# Patient Record
Sex: MALE | Race: BLACK OR AFRICAN AMERICAN | NOT HISPANIC OR LATINO | Employment: FULL TIME | URBAN - METROPOLITAN AREA
[De-identification: names, ages, dates, MRNs, and addresses within clinical notes are randomized per-mention and may not be internally consistent; named-entity substitution may affect disease eponyms.]

---

## 2019-03-13 ENCOUNTER — TELEPHONE (OUTPATIENT)
Dept: FAMILY MEDICINE | Facility: CLINIC | Age: 44
End: 2019-03-13

## 2019-03-13 NOTE — TELEPHONE ENCOUNTER
----- Message from Ashley Ponce sent at 3/13/2019  3:32 PM CDT -----  Contact: pt  Pt would like to be called back regarding getting a new patient appt    Pt can be reached at [phone]

## 2019-03-13 NOTE — TELEPHONE ENCOUNTER
Patient advised that Dr. Lee is not taking patient's at this time. Patient asked if Dr. Lee had any particular PCP that he recommends.

## 2019-03-15 NOTE — TELEPHONE ENCOUNTER
Patient advised per Dr. Lee that he did not have a particular provider he recommended, but did say that the providers at the [facility] are all very good.

## 2020-02-27 ENCOUNTER — TELEPHONE (OUTPATIENT)
Dept: BARIATRICS | Facility: CLINIC | Age: 45
End: 2020-02-27

## 2020-02-27 NOTE — TELEPHONE ENCOUNTER
Returned patient call. Was able to assist with scheduling a consult appointment.     Appointment reminder. Has been sent in the mail along with directions below.     Pt. verbally expressed understanding.

## 2020-04-29 ENCOUNTER — OFFICE VISIT (OUTPATIENT)
Dept: ALLERGY | Facility: CLINIC | Age: 45
End: 2020-04-29
Payer: COMMERCIAL

## 2020-04-29 VITALS — BODY MASS INDEX: 32.2 KG/M2 | HEIGHT: 73 IN | WEIGHT: 242.94 LBS

## 2020-04-29 DIAGNOSIS — J31.0 CHRONIC RHINITIS: Primary | ICD-10-CM

## 2020-04-29 DIAGNOSIS — K21.9 GASTROESOPHAGEAL REFLUX DISEASE, ESOPHAGITIS PRESENCE NOT SPECIFIED: ICD-10-CM

## 2020-04-29 PROCEDURE — 3008F PR BODY MASS INDEX (BMI) DOCUMENTED: ICD-10-PCS | Mod: CPTII,S$GLB,, | Performed by: ALLERGY & IMMUNOLOGY

## 2020-04-29 PROCEDURE — 99204 OFFICE O/P NEW MOD 45 MIN: CPT | Mod: S$GLB,,, | Performed by: ALLERGY & IMMUNOLOGY

## 2020-04-29 PROCEDURE — 99999 PR PBB SHADOW E&M-EST. PATIENT-LVL II: ICD-10-PCS | Mod: PBBFAC,,, | Performed by: ALLERGY & IMMUNOLOGY

## 2020-04-29 PROCEDURE — 99999 PR PBB SHADOW E&M-EST. PATIENT-LVL II: CPT | Mod: PBBFAC,,, | Performed by: ALLERGY & IMMUNOLOGY

## 2020-04-29 PROCEDURE — 3008F BODY MASS INDEX DOCD: CPT | Mod: CPTII,S$GLB,, | Performed by: ALLERGY & IMMUNOLOGY

## 2020-04-29 PROCEDURE — 99204 PR OFFICE/OUTPT VISIT, NEW, LEVL IV, 45-59 MIN: ICD-10-PCS | Mod: S$GLB,,, | Performed by: ALLERGY & IMMUNOLOGY

## 2020-04-29 RX ORDER — FLUTICASONE PROPIONATE 50 MCG
SPRAY, SUSPENSION (ML) NASAL
COMMUNITY
Start: 2020-03-26 | End: 2022-11-03

## 2020-04-29 RX ORDER — MINERAL OIL
180 ENEMA (ML) RECTAL DAILY
COMMUNITY
End: 2020-07-15 | Stop reason: CLARIF

## 2020-04-29 RX ORDER — HYDROGEN PEROXIDE 3 %
20 SOLUTION, NON-ORAL MISCELLANEOUS
Qty: 30 CAPSULE | Refills: 3 | Status: SHIPPED | OUTPATIENT
Start: 2020-04-29 | End: 2021-06-28

## 2020-04-29 RX ORDER — FLUTICASONE PROPIONATE 50 MCG
2 SPRAY, SUSPENSION (ML) NASAL DAILY
Qty: 16 G | Refills: 5 | Status: SHIPPED | OUTPATIENT
Start: 2020-04-29 | End: 2020-05-29

## 2020-04-29 NOTE — PROGRESS NOTES
Cortez Napoles is a 45-year-old male who presents to clinic today for evaluation of chronic rhinitis.  He is here alone.  His primary care physician is Dr. Elliot Uriostegui.    This past February he started having bilateral nasal congestion that alternates.  He initially saw a physician the prescribed antibiotics and Flonase.  His primary care physician then added Allegra.  He did try Sudafed for a while as well but did think it worked as well.    He does think on Flonase and Allegra his symptoms have improved.  He has reduced the amount Flonase that he takes two every other day.    He denies any conjunctivitis.  He denies any other rhinitis.  He denies any cough, wheezing, or shortness of breath.  He denies any history of asthma.    For ROS, FH, SH please see Allergy and Asthma Questionnaire dated today.    Some relevant pertinent positives:    Review of Systems/PMH:  He does have gastroesophageal reflux disease that is controlled on Nexium 20 milligrams a day.    Family History:  His brother has rhinitis.    Home environment:  He has lived in the same house for the past five years.  There was no water damage.  There is no evidence of mold.  There is carpeting in the bedroom.  There are no pets.    Social History:  He is a nonsmoker.  He works as an  with the [employer].    Physical Examination:  General: Well-developed, well-nourished, no acute distress.  Head: No sinus tenderness.  Eyes: Conjunctivae:  No bulbar or palpebral conjunctival injection.  Ears: EAC's clear.  TM's clear.  No pre-auricular nodes.  Nose: Nasal Mucosa:  Pink.  Septum: No apparent deviation.  Turbinates:  No significant edema.  Polyps/Mass:  None visible.  Teeth/Gums:  No bleeding noted.  Oropharynx: No exudates.  Neck: Supple without thyromegaly. No cervical lymphadenopathy.    Respiratory/Chest: Effort: Good.  Auscultation:  Clear bilaterally.  Cardiovascular:  No murmur, rubs, or gallop heard.   GI:   Non-tender.  No masses.  No organomegaly.  Extremities:  No cyanosis, clubbing, or edema.  Skin: Good turgor.  No urticaria or angioedema.  Neuro/Psych: Oriented x 3.    Assessment:  1.  Chronic rhinitis, consider allergic.  2.  GERD, controlled.    Recommendations:  1.  Laboratory as ordered.  2.  Increase fluticasone to two sprays each nostril daily.  3.  Continue Allegra for now.  4.  Return to clinic in one week.  5.  Consider skin testing off antihistamines if needed.

## 2020-05-07 ENCOUNTER — OFFICE VISIT (OUTPATIENT)
Dept: OTOLARYNGOLOGY | Facility: CLINIC | Age: 45
End: 2020-05-07
Payer: COMMERCIAL

## 2020-05-07 ENCOUNTER — OFFICE VISIT (OUTPATIENT)
Dept: ALLERGY | Facility: CLINIC | Age: 45
End: 2020-05-07
Payer: COMMERCIAL

## 2020-05-07 VITALS
DIASTOLIC BLOOD PRESSURE: 97 MMHG | TEMPERATURE: 98 F | SYSTOLIC BLOOD PRESSURE: 127 MMHG | BODY MASS INDEX: 31.76 KG/M2 | HEART RATE: 56 BPM | WEIGHT: 240.75 LBS

## 2020-05-07 VITALS — HEIGHT: 73 IN | BODY MASS INDEX: 31.91 KG/M2 | WEIGHT: 240.75 LBS

## 2020-05-07 DIAGNOSIS — J31.0 CHRONIC RHINITIS: Primary | ICD-10-CM

## 2020-05-07 DIAGNOSIS — R04.0 EPISTAXIS: ICD-10-CM

## 2020-05-07 DIAGNOSIS — J31.0 RHINITIS, UNSPECIFIED TYPE: Primary | ICD-10-CM

## 2020-05-07 DIAGNOSIS — K21.9 GASTROESOPHAGEAL REFLUX DISEASE, ESOPHAGITIS PRESENCE NOT SPECIFIED: ICD-10-CM

## 2020-05-07 PROCEDURE — 99999 PR PBB SHADOW E&M-EST. PATIENT-LVL III: ICD-10-PCS | Mod: PBBFAC,,, | Performed by: OTOLARYNGOLOGY

## 2020-05-07 PROCEDURE — 3008F PR BODY MASS INDEX (BMI) DOCUMENTED: ICD-10-PCS | Mod: CPTII,S$GLB,, | Performed by: ALLERGY & IMMUNOLOGY

## 2020-05-07 PROCEDURE — 3008F BODY MASS INDEX DOCD: CPT | Mod: CPTII,S$GLB,, | Performed by: ALLERGY & IMMUNOLOGY

## 2020-05-07 PROCEDURE — 99214 PR OFFICE/OUTPT VISIT, EST, LEVL IV, 30-39 MIN: ICD-10-PCS | Mod: 25,S$GLB,, | Performed by: ALLERGY & IMMUNOLOGY

## 2020-05-07 PROCEDURE — 99999 PR PBB SHADOW E&M-EST. PATIENT-LVL II: CPT | Mod: PBBFAC,,, | Performed by: ALLERGY & IMMUNOLOGY

## 2020-05-07 PROCEDURE — 99999 PR PBB SHADOW E&M-EST. PATIENT-LVL II: ICD-10-PCS | Mod: PBBFAC,,, | Performed by: ALLERGY & IMMUNOLOGY

## 2020-05-07 PROCEDURE — 3008F PR BODY MASS INDEX (BMI) DOCUMENTED: ICD-10-PCS | Mod: CPTII,S$GLB,, | Performed by: OTOLARYNGOLOGY

## 2020-05-07 PROCEDURE — 3008F BODY MASS INDEX DOCD: CPT | Mod: CPTII,S$GLB,, | Performed by: OTOLARYNGOLOGY

## 2020-05-07 PROCEDURE — 31231 NASAL ENDOSCOPY DX: CPT | Mod: S$GLB,,, | Performed by: OTOLARYNGOLOGY

## 2020-05-07 PROCEDURE — 95004 PR ALLERGY SKIN TESTS,ALLERGENS: ICD-10-PCS | Mod: S$GLB,,, | Performed by: ALLERGY & IMMUNOLOGY

## 2020-05-07 PROCEDURE — 99214 OFFICE O/P EST MOD 30 MIN: CPT | Mod: 25,S$GLB,, | Performed by: ALLERGY & IMMUNOLOGY

## 2020-05-07 PROCEDURE — 95004 PERQ TESTS W/ALRGNC XTRCS: CPT | Mod: S$GLB,,, | Performed by: ALLERGY & IMMUNOLOGY

## 2020-05-07 PROCEDURE — 99203 PR OFFICE/OUTPT VISIT, NEW, LEVL III, 30-44 MIN: ICD-10-PCS | Mod: 25,S$GLB,, | Performed by: OTOLARYNGOLOGY

## 2020-05-07 PROCEDURE — 31231 PR NASAL ENDOSCOPY, DX: ICD-10-PCS | Mod: S$GLB,,, | Performed by: OTOLARYNGOLOGY

## 2020-05-07 PROCEDURE — 99203 OFFICE O/P NEW LOW 30 MIN: CPT | Mod: 25,S$GLB,, | Performed by: OTOLARYNGOLOGY

## 2020-05-07 PROCEDURE — 99999 PR PBB SHADOW E&M-EST. PATIENT-LVL III: CPT | Mod: PBBFAC,,, | Performed by: OTOLARYNGOLOGY

## 2020-05-07 RX ORDER — METHYLPREDNISOLONE 4 MG/1
TABLET ORAL
Qty: 1 PACKAGE | Refills: 0 | Status: SHIPPED | OUTPATIENT
Start: 2020-05-07 | End: 2020-05-28

## 2020-05-07 NOTE — PROGRESS NOTES
Cortez Napoles returns to clinic today for continued evaluation of chronic rhinitis.  He is here alone.  He was last seen April 29, 2020.    Since his last visit, he has been taking Flonase daily.  He was able to stop his Allegra last Sunday.    He continues to have bilateral nasal congestion that alternates.  He thinks that is worse on the left.    Over the past week he has had to nose bleeds on the left.  They have lasted several minutes before resolving after applying pressure.  He also had another one after blowing his nose that resolved quickly.    He denies any conjunctivitis.  He denies any cough, wheezing, or shortness of breath.  He denies any history of asthma.    [facility] PEQ ALLERGY QUESTIONNAIRE SHORT 5/7/2020   Head or facial pain: No symptoms   Ear discharge? No   Ear pain? No   Hearing loss? No   Nosebleeds? Yes   Postnasal drip? No   Sneezing? Yes   Runny nose? No   Congestion? Yes   Throat: No symptoms   Eyes: No symptoms   Lungs: No symptoms   Skin: No symptoms     Physical Examination:  General: Well-developed, well-nourished, no acute distress.  Head: No sinus tenderness.  Eyes: Conjunctivae:  No bulbar or palpebral conjunctival injection.  Ears: EAC's clear.  TM's clear.  No pre-auricular nodes.  Nose: Nasal Mucosa:  Pink.  Septum: No apparent deviation.  Turbinates:  No significant edema.  Polyps/Mass:  None visible.  Teeth/Gums:  No bleeding noted.  Oropharynx: No exudates.  Neck: Supple without thyromegaly. No cervical lymphadenopathy.    Respiratory/Chest: Effort: Good.  Auscultation:  Clear bilaterally.  Skin: Good turgor.  No urticaria or angioedema.  Neuro/Psych: Oriented x 3.    Laboratory 04/29/2020:  IgE level:  Less than 35.  ImmunoCAP:  Negative.    Inhalant skin test 5/7/2020:  3+ histamine control.  All tests were negative.    Assessment:  1.  Chronic rhinitis, nonallergic.  2.  Epistaxis, possibly secondary to Flonase use.  3.  GERD, controlled.    Recommendations:  1.  Discontinue  Flonase for now.  2.  ENT evaluation for nasal congestion and epistaxis.  3.  Return to clinic as needed.

## 2020-05-07 NOTE — LETTER
May 7, 2020      MARKY Gaviria III, MD  [address] [facility]  [facility] [address]           [facility]  [address]  [place] [address]  Phone: [phone]  Fax: [phone]          Patient: Cortez Napoles   MR Number: [id number]   YOB: 1975   Date of Visit: 5/7/2020       Dear Dr. MARKY Gaviria III:    Thank you for referring Cortez Napoles to me for evaluation. Attached you will find relevant portions of my assessment and plan of care.    If you have questions, please do not hesitate to call me. I look forward to following Cortez Napoles along with you.    Sincerely,    Steven Ferrell MD    Enclosure  CC:  No Recipients    If you would like to receive this communication electronically, please contact [email or web address] or [phone] to request more information on [email or web address] Link access.    For providers and/or their staff who would like to refer a patient to [facility], please contact us through our one-stop-shop provider referral line, [facility], at [phone].    If you feel you have received this communication in error or would no longer like to receive these types of communications, please e-mail [email or web address]

## 2020-05-08 PROBLEM — J31.0 RHINITIS: Status: ACTIVE | Noted: 2020-05-08

## 2020-05-08 NOTE — PROGRESS NOTES
Chief Complaint   Patient presents with    Consult     L nasal congestion, nasal bleeds       HPI   45 y.o. male presents for evaluation of nasal congestion, left greater than right and recent left-sided epistaxis.  He reports he has had 3 episodes of epistaxis in the last several weeks.  These all stop spontaneously.  He bleeds only from the left side of his nose.  He denies pain or headache.  He reports some degree of nasal congestion.  He has been utilizing Flonase for this problem.    Review of Systems   Constitutional: Negative for fatigue and unexpected weight change.   HENT: Per HPI.  Eyes: Negative for visual disturbance.   Respiratory: Negative for shortness of breath, hemoptysis   Cardiovascular: Negative for chest pain and palpitations.   Musculoskeletal: Negative for decreased ROM, back pain.   Skin: Negative for rash, sunburn, itching.   Neurological: Negative for dizziness and seizures.   Hematological: Negative for adenopathy. Does not bruise/bleed easily.   Endocrine: Negative for rapid weight loss/weight gain, heat/cold intolerance.     Past Medical History   There is no problem list on file for this patient.          Past Surgical History   History reviewed. No pertinent surgical history.      Family History   History reviewed. No pertinent family history.        Social History   .  Social History     Socioeconomic History    Marital status: Single     Spouse name: Not on file    Number of children: Not on file    Years of education: Not on file    Highest education level: Not on file   Occupational History    Not on file   Social Needs    Financial resource strain: Not on file    Food insecurity:     Worry: Not on file     Inability: Not on file    Transportation needs:     Medical: Not on file     Non-medical: Not on file   Tobacco Use    Smoking status: Never Smoker    Smokeless tobacco: Never Used   Substance and Sexual Activity    Alcohol use: Yes    Drug use: Not on file     Sexual activity: Not on file   Lifestyle    Physical activity:     Days per week: Not on file     Minutes per session: Not on file    Stress: Not on file   Relationships    Social connections:     Talks on phone: Not on file     Gets together: Not on file     Attends Voodoo service: Not on file     Active member of club or organization: Not on file     Attends meetings of clubs or organizations: Not on file     Relationship status: Not on file   Other Topics Concern    Not on file   Social History Narrative    Not on file         Allergies   Review of patient's allergies indicates:  No Known Allergies        Physical Exam     Vitals:    05/07/20 0919   BP: (!) 127/97   Pulse: (!) 56   Temp: 97.5 °F (36.4 °C)         Body mass index is 31.76 kg/m².      General: AOx3, NAD   Respiratory:  Symmetric chest rise, normal effort  Nose: No gross nasal septal deviation.  Small excoriation of left anterior nasal septum.  Inferior Turbinates WNL bilaterally. No septal perforation. No masses/lesions.   Oral Cavity:  Oral Tongue mobile, no lesions noted. Hard Palate WNL. No buccal or FOM lesions.  Oropharynx:  No masses/lesions of the posterior pharyngeal wall. Tonsillar fossa without lesions. Soft palate without masses. Midline uvula.   Neck: No scars.  No cervical lymphadenopathy, thyromegaly or thyroid nodules.  Normal range of motion.    Face: House Brackmann I bilaterally.     Nasal Nasoph Endocope Procedures #4    Procedure:  Diagnostic nasal and nasopharyngoscopy with endoscope:    Routine preparation with local atomizer with 1% Romain-Synephrine/Pontocaine with customary zero degree and/or 30 degree Storz endoscope:    NOSE:     External:  No gross deformity.   Intranasal:    Mucosa:  No polyps, ulcers or lesions.    Septum:  No gross deformity.    Turbinates:  Not enlarged.   Nasopharynx:  No lesions.    Mucosa:  No lesions.    Adenoids:  Present.    Posterior Choanae:  Patent.    Eustachian Tubes:   Patent.    Additional Findings:  No worrisome lesions.    Assessment/Plan  Problem List Items Addressed This Visit        ENT    Rhinitis - Primary     With associated epistaxis.  I suspect that his nosebleeds a secondary to the drying effect of Flonase.  I urged him to stop using this medication.  I see no worrisome lesions on exam.  I also urged him to moisturize his nose with nasal saline spray and to apply Vaseline or Aquaphor to the anterior aspect of the nose to further moisturize it.  I provided a Medrol Dosepak for relief of any inflammation that may be contributing to this complaint.  He is to contact me if he is not improved in 2 weeks.         Relevant Medications    methylPREDNISolone (MEDROL DOSEPACK) 4 mg tablet

## 2020-05-08 NOTE — ASSESSMENT & PLAN NOTE
With associated epistaxis.  I suspect that his nosebleeds a secondary to the drying effect of Flonase.  I urged him to stop using this medication.  I see no worrisome lesions on exam.  I also urged him to moisturize his nose with nasal saline spray and to apply Vaseline or Aquaphor to the anterior aspect of the nose to further moisturize it.  I provided a Medrol Dosepak for relief of any inflammation that may be contributing to this complaint.  He is to contact me if he is not improved in 2 weeks.

## 2020-05-18 ENCOUNTER — OFFICE VISIT (OUTPATIENT)
Dept: INTERNAL MEDICINE | Facility: CLINIC | Age: 45
End: 2020-05-18
Payer: COMMERCIAL

## 2020-05-18 ENCOUNTER — LAB VISIT (OUTPATIENT)
Dept: LAB | Facility: HOSPITAL | Age: 45
End: 2020-05-18
Attending: NURSE PRACTITIONER
Payer: COMMERCIAL

## 2020-05-18 VITALS
WEIGHT: 237.88 LBS | HEART RATE: 57 BPM | SYSTOLIC BLOOD PRESSURE: 102 MMHG | RESPIRATION RATE: 16 BRPM | HEIGHT: 73 IN | TEMPERATURE: 98 F | DIASTOLIC BLOOD PRESSURE: 80 MMHG | BODY MASS INDEX: 31.53 KG/M2

## 2020-05-18 DIAGNOSIS — Z76.89 ENCOUNTER TO ESTABLISH CARE: ICD-10-CM

## 2020-05-18 DIAGNOSIS — J32.0 CHRONIC MAXILLARY SINUSITIS: Primary | ICD-10-CM

## 2020-05-18 DIAGNOSIS — J32.0 CHRONIC MAXILLARY SINUSITIS: ICD-10-CM

## 2020-05-18 LAB
ALBUMIN SERPL BCP-MCNC: 4 G/DL (ref 3.5–5.2)
ALP SERPL-CCNC: 59 U/L (ref 55–135)
ALT SERPL W/O P-5'-P-CCNC: 54 U/L (ref 10–44)
ANION GAP SERPL CALC-SCNC: 6 MMOL/L (ref 8–16)
AST SERPL-CCNC: 32 U/L (ref 10–40)
BASOPHILS # BLD AUTO: 0.03 K/UL (ref 0–0.2)
BASOPHILS NFR BLD: 0.5 % (ref 0–1.9)
BILIRUB SERPL-MCNC: 0.2 MG/DL (ref 0.1–1)
BUN SERPL-MCNC: 11 MG/DL (ref 6–20)
CALCIUM SERPL-MCNC: 9.1 MG/DL (ref 8.7–10.5)
CHLORIDE SERPL-SCNC: 104 MMOL/L (ref 95–110)
CO2 SERPL-SCNC: 30 MMOL/L (ref 23–29)
CREAT SERPL-MCNC: 1.3 MG/DL (ref 0.5–1.4)
DIFFERENTIAL METHOD: ABNORMAL
EOSINOPHIL # BLD AUTO: 0.1 K/UL (ref 0–0.5)
EOSINOPHIL NFR BLD: 1.3 % (ref 0–8)
ERYTHROCYTE [DISTWIDTH] IN BLOOD BY AUTOMATED COUNT: 14.7 % (ref 11.5–14.5)
EST. GFR  (AFRICAN AMERICAN): >60 ML/MIN/1.73 M^2
EST. GFR  (NON AFRICAN AMERICAN): >60 ML/MIN/1.73 M^2
GLUCOSE SERPL-MCNC: 82 MG/DL (ref 70–110)
HCT VFR BLD AUTO: 46.1 % (ref 40–54)
HGB BLD-MCNC: 14.9 G/DL (ref 14–18)
IMM GRANULOCYTES # BLD AUTO: 0.02 K/UL (ref 0–0.04)
IMM GRANULOCYTES NFR BLD AUTO: 0.3 % (ref 0–0.5)
LYMPHOCYTES # BLD AUTO: 2.6 K/UL (ref 1–4.8)
LYMPHOCYTES NFR BLD: 41.1 % (ref 18–48)
MCH RBC QN AUTO: 30.8 PG (ref 27–31)
MCHC RBC AUTO-ENTMCNC: 32.3 G/DL (ref 32–36)
MCV RBC AUTO: 95 FL (ref 82–98)
MONOCYTES # BLD AUTO: 0.6 K/UL (ref 0.3–1)
MONOCYTES NFR BLD: 8.8 % (ref 4–15)
NEUTROPHILS # BLD AUTO: 3.1 K/UL (ref 1.8–7.7)
NEUTROPHILS NFR BLD: 48 % (ref 38–73)
NRBC BLD-RTO: 0 /100 WBC
PLATELET # BLD AUTO: 186 K/UL (ref 150–350)
PMV BLD AUTO: 11.3 FL (ref 9.2–12.9)
POTASSIUM SERPL-SCNC: 4.3 MMOL/L (ref 3.5–5.1)
PROT SERPL-MCNC: 7.1 G/DL (ref 6–8.4)
RBC # BLD AUTO: 4.84 M/UL (ref 4.6–6.2)
SODIUM SERPL-SCNC: 140 MMOL/L (ref 136–145)
WBC # BLD AUTO: 6.37 K/UL (ref 3.9–12.7)

## 2020-05-18 PROCEDURE — 99999 PR PBB SHADOW E&M-EST. PATIENT-LVL III: CPT | Mod: PBBFAC,,, | Performed by: NURSE PRACTITIONER

## 2020-05-18 PROCEDURE — 80053 COMPREHEN METABOLIC PANEL: CPT

## 2020-05-18 PROCEDURE — 36415 COLL VENOUS BLD VENIPUNCTURE: CPT | Mod: PO

## 2020-05-18 PROCEDURE — 99203 PR OFFICE/OUTPT VISIT, NEW, LEVL III, 30-44 MIN: ICD-10-PCS | Mod: S$GLB,,, | Performed by: NURSE PRACTITIONER

## 2020-05-18 PROCEDURE — 99999 PR PBB SHADOW E&M-EST. PATIENT-LVL III: ICD-10-PCS | Mod: PBBFAC,,, | Performed by: NURSE PRACTITIONER

## 2020-05-18 PROCEDURE — 99203 OFFICE O/P NEW LOW 30 MIN: CPT | Mod: S$GLB,,, | Performed by: NURSE PRACTITIONER

## 2020-05-18 PROCEDURE — 85025 COMPLETE CBC W/AUTO DIFF WBC: CPT

## 2020-05-18 NOTE — PROGRESS NOTES
[facility] Note    Chief Complaint      Chief Complaint   Patient presents with    Annual Exam     History of Present Illness      Cortez Napoles is a 45 y.o. male patient with chronic conditions of GERD, seasonal allergies who is new to me and presents today for establish care visit.  Patient complains chronic sinusitis over the last 3 months, denies shortness of breath or chest pain, reviewed meds and history with patient.  Patient reports was seen by ENT, and was allergy tested.  Patient states tried to take allergy medicine but dried his nasal mucus up too much, but does have a lot of nasal congestion and head pressure.  Denies fever chills shortness of breath unable to swallow loss of hearing.  Is taking Mucinex daily.  Patient wants to know if he has an infection and 1 some sort of testing done    Patient drinks plenty water, eat to clean diet, runs jump throat and plays basketball for exercise.    Problem List Items Addressed This Visit     None      Visit Diagnoses     Chronic maxillary sinusitis    -  Primary    Relevant Orders    CBC auto differential (Completed)    Comprehensive metabolic panel (Completed)    Encounter to establish care              Health Maintenance   Topic Date Due    Lipid Panel  1975    TETANUS VACCINE  04/14/1991       History reviewed. No pertinent past medical history.    History reviewed. No pertinent surgical history.    family history is not on file.     Social History     Tobacco Use    Smoking status: Never Smoker    Smokeless tobacco: Never Used   Substance Use Topics    Alcohol use: Yes    Drug use: Not on file       Review of Systems   Constitutional: Negative for chills and fever.   HENT: Positive for congestion and sinus pain. Negative for sore throat.    Eyes: Negative for blurred vision.   Respiratory: Negative for cough, shortness of breath and wheezing.    Cardiovascular: Negative for chest pain, palpitations and leg swelling.  "  Gastrointestinal: Negative for abdominal pain, constipation, diarrhea, nausea and vomiting.   Genitourinary: Negative for dysuria.   Musculoskeletal: Negative for myalgias.   Neurological: Negative for dizziness, weakness and headaches.   Psychiatric/Behavioral: Negative for depression. The patient is not nervous/anxious.         Outpatient Encounter Medications as of 5/18/2020   Medication Sig Dispense Refill    ELDERBERRY FRUIT AND FLOWER ORAL Take by mouth.      esomeprazole (NEXIUM) 20 MG capsule Take 1 capsule (20 mg total) by mouth before breakfast. 30 capsule 3    fexofenadine (ALLEGRA) 180 MG tablet Take 180 mg by mouth once daily.      fluticasone propionate (FLONASE) 50 mcg/actuation nasal spray       fluticasone propionate (FLONASE) 50 mcg/actuation nasal spray 2 sprays (100 mcg total) by Each Nostril route once daily. 16 g 5    methylPREDNISolone (MEDROL DOSEPACK) 4 mg tablet use as directed 1 Package 0     No facility-administered encounter medications on file as of 5/18/2020.        Review of patient's allergies indicates:  No Known Allergies    Physical Exam      Vital Signs  Temp: 98.3 °F (36.8 °C)  Temp src: Oral  Pulse: (!) 57  Resp: 16  BP: 102/80  Pain Score: 0-No pain  Height and Weight  Height: 6' 1" (185.4 cm)  Weight: 107.9 kg (237 lb 14 oz)  BSA (Calculated - sq m): 2.36 sq meters  BMI (Calculated): 31.4  Weight in (lb) to have BMI = 25: 189.1]    Physical Exam   Constitutional: He is oriented to person, place, and time. He appears well-developed and well-nourished.   HENT:   Head: Normocephalic and atraumatic.   Right Ear: External ear normal.   Left Ear: External ear normal.   Slight erythema noted to oropharynx, redness to ear canals bilaterally noted.   Eyes: Pupils are equal, round, and reactive to light. Conjunctivae and EOM are normal.   Neck: Normal range of motion. Neck supple. No JVD present. No tracheal deviation present. No thyromegaly present.   Cardiovascular: Normal " rate, regular rhythm, normal heart sounds and intact distal pulses.   Pulmonary/Chest: Effort normal and breath sounds normal. No respiratory distress.   Abdominal: Soft. Bowel sounds are normal. He exhibits no distension. There is no tenderness.   Musculoskeletal: Normal range of motion.   Lymphadenopathy:     He has no cervical adenopathy.   Neurological: He is alert and oriented to person, place, and time.   Skin: Skin is warm and dry. No rash noted. No erythema. No pallor.   Psychiatric: He has a normal mood and affect. His behavior is normal. Judgment and thought content normal.   Nursing note and vitals reviewed.       Laboratory:  CBC:  Recent Labs   Lab Result Units 05/18/20  1328   WBC K/uL 6.37   RBC M/uL 4.84   Hemoglobin g/dL 14.9   Hematocrit % 46.1   Platelets K/uL 186   Mean Corpuscular Volume fL 95   Mean Corpuscular Hemoglobin pg 30.8   Mean Corpuscular Hemoglobin Conc g/dL 32.3     CMP:  Recent Labs   Lab Result Units 05/18/20  1328   Glucose mg/dL 82   Calcium mg/dL 9.1   Albumin g/dL 4.0   Total Protein g/dL 7.1   Sodium mmol/L 140   Potassium mmol/L 4.3   CO2 mmol/L 30*   Chloride mmol/L 104   BUN, Bld mg/dL 11   Alkaline Phosphatase U/L 59   ALT U/L 54*   AST U/L 32   Total Bilirubin mg/dL 0.2     URINALYSIS:  No results for input(s): COLORU, CLARITYU, SPECGRAV, PHUR, PROTEINUA, GLUCOSEU, BILIRUBINCON, BLOODU, WBCU, RBCU, BACTERIA, MUCUS, NITRITE, LEUKOCYTESUR, UROBILINOGEN, HYALINECASTS in the last 2160 hours.   LIPIDS:  No results for input(s): TSH, HDL, CHOL, TRIG, LDLCALC, CHOLHDL, NONHDLCHOL, TOTALCHOLEST in the last 2160 hours.  TSH:  No results for input(s): TSH in the last 2160 hours.  A1C:  No results for input(s): HGBA1C in the last 2160 hours.      Assessment/Plan     Cortez Napoles is a 45 y.o.male with:    1. Encounter to establish care    2. Chronic maxillary sinusitis  - CBC auto differential; Future  - Comprehensive metabolic panel; Future      -Continue current medications and  maintain follow up with specialists.  Return to clinic to establish with Dr. Teresa Triana, NP-C  [facility]

## 2020-05-19 ENCOUNTER — TELEPHONE (OUTPATIENT)
Dept: INTERNAL MEDICINE | Facility: CLINIC | Age: 45
End: 2020-05-19

## 2020-05-21 ENCOUNTER — TELEPHONE (OUTPATIENT)
Dept: INTERNAL MEDICINE | Facility: CLINIC | Age: 45
End: 2020-05-21

## 2020-05-21 NOTE — TELEPHONE ENCOUNTER
Pt tried anbx a couple of months ago when it was bad. Not taking zyrtec doesn't like how it makes him feel. He is not taking anything, will follow up with ENT if need be.

## 2020-05-21 NOTE — TELEPHONE ENCOUNTER
----- Message from Lucia Triana NP sent at 5/20/2020  5:45 PM CDT -----  Can pt trial zyrtec  I think he should continue the moisturizing of his nostril with the saline.   ENT noted states that they wanted him to return after 2 weeks if no relief, is he going to f/u with them? Has he tried any anbx?

## 2020-05-27 ENCOUNTER — TELEPHONE (OUTPATIENT)
Dept: BARIATRICS | Facility: CLINIC | Age: 45
End: 2020-05-27

## 2020-06-05 ENCOUNTER — OFFICE VISIT (OUTPATIENT)
Dept: OTOLARYNGOLOGY | Facility: CLINIC | Age: 45
End: 2020-06-05
Payer: COMMERCIAL

## 2020-06-05 DIAGNOSIS — J34.89 NASAL OBSTRUCTION: Primary | ICD-10-CM

## 2020-06-05 DIAGNOSIS — J34.3 NASAL TURBINATE HYPERTROPHY: ICD-10-CM

## 2020-06-05 PROCEDURE — 99213 PR OFFICE/OUTPT VISIT, EST, LEVL III, 20-29 MIN: ICD-10-PCS | Mod: S$GLB,,, | Performed by: NURSE PRACTITIONER

## 2020-06-05 PROCEDURE — 99999 PR PBB SHADOW E&M-EST. PATIENT-LVL II: ICD-10-PCS | Mod: PBBFAC,,, | Performed by: NURSE PRACTITIONER

## 2020-06-05 PROCEDURE — 99213 OFFICE O/P EST LOW 20 MIN: CPT | Mod: S$GLB,,, | Performed by: NURSE PRACTITIONER

## 2020-06-05 PROCEDURE — 99999 PR PBB SHADOW E&M-EST. PATIENT-LVL II: CPT | Mod: PBBFAC,,, | Performed by: NURSE PRACTITIONER

## 2020-06-05 NOTE — PROGRESS NOTES
Subjective:      Cortez Napoles is a 45 y.o. male who was referred to me by Dr. Steven Ferrell in consultation for nasal obstruction.    Mr. Napoles reports nasal obstruction in both nostrils for the past 4-5 months. He reports difficulty breathing through his nose making sleeping difficulty. He has tried fluticasone and Allegra daily but began having nosebleeding so he stopped. He reports limited benefit while taking the fluticasone. He has also tried oral steroid without benefit. Most recently he has been taking Elderberry with limited benefit. He denies use of any nasal decongestants. He has recently been evaluated by Allergy.     He recalls previously having allergy testing.    He denies a history of asthma.    He relates a history of reflux symptoms which is currently managed with omesprazole.      He denies have a diagnosis of obstructive sleep apnea.     He has not had sinonasal surgery.    He does not recall a prior history of nasal trauma.      Past Medical History  He has no past medical history on file.    Past Surgical History  He has a past surgical history that includes Knee arthroscopy w/ meniscal repair.    Family History  His family history includes Colon cancer in his maternal grandfather.    Social History  He reports that he has never smoked. He has never used smokeless tobacco. He reports that he drinks alcohol.    Allergies  He has No Known Allergies.    Medications   He has a current medication list which includes the following prescription(s): elderberry fruit and flower, esomeprazole, fexofenadine, and fluticasone propionate.    Review of Systems  Review of Systems   Constitutional: Negative for chills, fatigue and fever.   HENT: Positive for congestion. Negative for facial swelling, nosebleeds, postnasal drip, rhinorrhea, sinus pressure, sinus pain, sneezing, sore throat and tinnitus.    Eyes: Negative for photophobia, redness, itching and visual disturbance.   Respiratory: Negative  for apnea, cough, shortness of breath, wheezing and stridor.    Cardiovascular: Negative for chest pain and palpitations.   Gastrointestinal: Negative for diarrhea, nausea and vomiting.   Endocrine: Negative.    Genitourinary: Negative for decreased urine volume, dysuria and frequency.   Musculoskeletal: Negative for arthralgias, myalgias and neck stiffness.   Skin: Negative for rash and wound.   Allergic/Immunologic: Negative for environmental allergies, food allergies and immunocompromised state.   Neurological: Negative for dizziness, syncope, weakness, light-headedness and headaches.   Hematological: Negative for adenopathy. Does not bruise/bleed easily.   Psychiatric/Behavioral: Negative for confusion, decreased concentration and sleep disturbance.          Objective:     There were no vitals taken for this visit.       Constitutional:   He is oriented to person, place, and time. Vital signs are normal. He appears well-developed and well-nourished. He appears alert. Normal speech.      Head:  Normocephalic and atraumatic.     Ears:    Right Ear: No lacerations. No drainage, swelling or tenderness. No foreign bodies. No mastoid tenderness. Tympanic membrane is not injected, not scarred, not perforated, not erythematous, not retracted and not bulging. Tympanic membrane mobility is normal. No middle ear effusion. No hemotympanum.   Left Ear: No lacerations. No drainage, swelling or tenderness. No foreign bodies. No mastoid tenderness. Tympanic membrane is not injected, not scarred, not perforated, not erythematous, not retracted and not bulging. Tympanic membrane mobility is normal.  No middle ear effusion. No hemotympanum.     Nose:  Mucosal edema present. No rhinorrhea, nose lacerations, sinus tenderness, septal deviation, nasal septal hematoma or polyps. No epistaxis.  No foreign bodies. Turbinate hypertrophy (3-4+ bilaterally).  Right sinus exhibits no maxillary sinus tenderness and no frontal sinus  tenderness. Left sinus exhibits no maxillary sinus tenderness and no frontal sinus tenderness.     Mouth/Throat  Oropharynx clear and moist without lesions or asymmetry, normal uvula midline and lips, teeth, and gums normal. No uvula swelling, oral lesions, trismus, mucous membrane lesions or xerostomia. No oropharyngeal exudate or posterior oropharyngeal erythema.     Neck:  Neck normal without thyromegaly masses, asymmetry, normal tracheal structure, crepitus, and tenderness and no adenopathy.     Psychiatric:   He has a normal mood and affect. His speech is normal and behavior is normal.     Neurological:   He is alert and oriented to person, place, and time. No cranial nerve deficit.     Skin:   No abrasions, lacerations, lesions, or rashes.       Procedure    None      Data Reviewed    WBC (K/uL)   Date Value   05/18/2020 6.37     Eosinophil% (%)   Date Value   05/18/2020 1.3     Eos # (K/uL)   Date Value   05/18/2020 0.1     Platelets (K/uL)   Date Value   05/18/2020 186     Glucose (mg/dL)   Date Value   05/18/2020 82     IgE (IU/mL)   Date Value   04/29/2020 <35       No sinus imaging available.       Assessment:     1. Nasal obstruction    2. Nasal turbinate hypertrophy         Plan:     I had a long discussion with the patient regarding his condition and the further workup and management options.    Cortez was seen today for sinusitis and sinus problem.    Diagnoses and all orders for this visit:    Nasal obstruction  -     Ambulatory referral/consult to ENT; Future    Nasal turbinate hypertrophy    - Restart fluticasone 2 sprays each nostril once daily. I have demonstrated proper nasal spray technique.   - Stop Elderberry.  - Referral placed to Dr. AURELIO Banegas with Rhinology for evaluation and management. May benefit from turb reduction?    Follow up with me as needed.

## 2020-06-05 NOTE — LETTER
June 5, 2020      Steven Ferrell MD  [address]  [facility] [address]           [facility] - Otorhinolaryngology  [address]  [facility] [address]  Phone: [phone]  Fax: [phone]          Patient: Cortez Napoles   MR Number: [id number]   YOB: 1975   Date of Visit: 6/5/2020       Dear Dr. Steven Ferrell:    Thank you for referring Cortez Napoles to me for evaluation. Attached you will find relevant portions of my assessment and plan of care.    If you have questions, please do not hesitate to call me. I look forward to following Cortez Napoles along with you.    Sincerely,    Elena Hannah, NP    Enclosure  CC:  No Recipients    If you would like to receive this communication electronically, please contact [email or web address] or [phone] to request more information on [facility] access.    For providers and/or their staff who would like to refer a patient to [facility], please contact us through our one-stop-shop provider referral line, [facility], at [phone].    If you feel you have received this communication in error or would no longer like to receive these types of communications, please e-mail [email or web address]

## 2020-06-08 DIAGNOSIS — Z01.818 PRE-OP TESTING: Primary | ICD-10-CM

## 2020-06-15 ENCOUNTER — TELEPHONE (OUTPATIENT)
Dept: OTOLARYNGOLOGY | Facility: CLINIC | Age: 45
End: 2020-06-15

## 2020-06-15 NOTE — TELEPHONE ENCOUNTER
Spoke with patient regarding pre-covid testing for his 6/26 appt with Dr Banegas at [facility]. Patient is having Covid test done by employer on 6/22 (nasoparyngeal swab) and antibodies blood work. Work advised him that those results will be back in 24-48 hours. Patient wanted to know if he could use this instead of having his insurance billed again for it. Notified him if he could give us the results the day before the appt to verify they are good then we can use them.

## 2020-06-15 NOTE — TELEPHONE ENCOUNTER
----- Message from Kourtney Looney sent at 6/15/2020  9:04 AM CDT -----  Pt would like to be called back regarding  procedure sechedule on 6/26 and Covid test on 6/23    Pt can be reached at [phone]

## 2020-06-23 ENCOUNTER — TELEPHONE (OUTPATIENT)
Dept: OTOLARYNGOLOGY | Facility: CLINIC | Age: 45
End: 2020-06-23

## 2020-06-24 ENCOUNTER — TELEPHONE (OUTPATIENT)
Dept: INTERNAL MEDICINE | Facility: CLINIC | Age: 45
End: 2020-06-24

## 2020-06-24 NOTE — TELEPHONE ENCOUNTER
----- Message from Tyra Rey sent at 6/24/2020 10:40 AM CDT -----  Contact: self [phone]  Pt states he tested positive for COVID 19. Pt states he had the testing done on 6/22 at [facility]. Pt states he is not currently having any symptoms. Pt is asking about a Rx for a z-pack or vitamin D to help his immune system. Pt states he had a past antibody test showing he was + for the COVID antibodies. Pt states he did work out yesterday. Please call and advise.

## 2020-06-24 NOTE — TELEPHONE ENCOUNTER
Informed pt zpack will not help immune system. Instructed pt to have someone go to store and get him otc vitamin d. Pt needs to quarantine for 14days

## 2020-06-25 ENCOUNTER — TELEPHONE (OUTPATIENT)
Dept: OTOLARYNGOLOGY | Facility: CLINIC | Age: 45
End: 2020-06-25

## 2020-06-25 DIAGNOSIS — J34.3 HYPERTROPHY OF NASAL TURBINATES: ICD-10-CM

## 2020-06-25 DIAGNOSIS — J32.9 SINUSITIS, UNSPECIFIED CHRONICITY, UNSPECIFIED LOCATION: Primary | ICD-10-CM

## 2020-06-25 NOTE — TELEPHONE ENCOUNTER
Called Patient back and he is Covid Positive and cancelled his appointment to see Dr. Banegas. Patient [place] Covid Test Sw[place] and [place] [facility]  [place] a [place] call [place] [facility] [phone] to let him know that the Blood Work and Nasal was Positive.

## 2020-06-25 NOTE — TELEPHONE ENCOUNTER
----- Message from Alicia Hernadez sent at 6/25/2020  8:33 AM CDT -----  Pt is requesting a call back, pt went had the covid testing and tested positive. Pt is asking for a call back to have his appt rescheduled for him to get ref by Elena robison turb reduction      Contact Info [phone] (home)

## 2020-07-02 ENCOUNTER — HOSPITAL ENCOUNTER (OUTPATIENT)
Dept: PREADMISSION TESTING | Facility: OTHER | Age: 45
Discharge: HOME | End: 2020-07-02
Attending: OTOLARYNGOLOGY
Payer: COMMERCIAL

## 2020-07-02 DIAGNOSIS — J34.3 HYPERTROPHY OF NASAL TURBINATES: ICD-10-CM

## 2020-07-02 DIAGNOSIS — J32.9 SINUSITIS, UNSPECIFIED CHRONICITY, UNSPECIFIED LOCATION: ICD-10-CM

## 2020-07-02 PROCEDURE — U0003 INFECTIOUS AGENT DETECTION BY NUCLEIC ACID (DNA OR RNA); SEVERE ACUTE RESPIRATORY SYNDROME CORONAVIRUS 2 (SARS-COV-2) (CORONAVIRUS DISEASE [COVID-19]), AMPLIFIED PROBE TECHNIQUE, MAKING USE OF HIGH THROUGHPUT TECHNOLOGIES AS DESCRIBED BY CMS-2020-01-R: HCPCS

## 2020-07-03 LAB — SARS-COV-2 RNA RESP QL NAA+PROBE: NOT DETECTED

## 2020-07-06 ENCOUNTER — OFFICE VISIT (OUTPATIENT)
Dept: OTOLARYNGOLOGY | Facility: CLINIC | Age: 45
End: 2020-07-06
Payer: COMMERCIAL

## 2020-07-06 VITALS
TEMPERATURE: 98 F | WEIGHT: 247.5 LBS | BODY MASS INDEX: 32.8 KG/M2 | SYSTOLIC BLOOD PRESSURE: 122 MMHG | HEIGHT: 73 IN | DIASTOLIC BLOOD PRESSURE: 82 MMHG

## 2020-07-06 DIAGNOSIS — M95.0 NASAL VALVE COLLAPSE: ICD-10-CM

## 2020-07-06 DIAGNOSIS — J34.89 REFRACTORY OBSTRUCTION OF NASAL AIRWAY: Primary | ICD-10-CM

## 2020-07-06 DIAGNOSIS — J34.89 NASAL OBSTRUCTION: Primary | ICD-10-CM

## 2020-07-06 DIAGNOSIS — J34.3 HYPERTROPHY OF NASAL TURBINATES: ICD-10-CM

## 2020-07-06 DIAGNOSIS — J34.89 NASAL OBSTRUCTION: ICD-10-CM

## 2020-07-06 DIAGNOSIS — J34.89 NASAL VESTIBULITIS: ICD-10-CM

## 2020-07-06 DIAGNOSIS — J34.2 NASAL SEPTAL DEVIATION: ICD-10-CM

## 2020-07-06 DIAGNOSIS — J31.0 RHINITIS, UNSPECIFIED TYPE: ICD-10-CM

## 2020-07-06 PROCEDURE — 99214 PR OFFICE/OUTPT VISIT, EST, LEVL IV, 30-39 MIN: ICD-10-PCS | Mod: 25,S$GLB,, | Performed by: OTOLARYNGOLOGY

## 2020-07-06 PROCEDURE — 3008F BODY MASS INDEX DOCD: CPT | Mod: CPTII,S$GLB,, | Performed by: OTOLARYNGOLOGY

## 2020-07-06 PROCEDURE — 99214 OFFICE O/P EST MOD 30 MIN: CPT | Mod: 25,S$GLB,, | Performed by: OTOLARYNGOLOGY

## 2020-07-06 PROCEDURE — 31231 NASAL ENDOSCOPY DX: CPT | Mod: S$GLB,,, | Performed by: OTOLARYNGOLOGY

## 2020-07-06 PROCEDURE — 31231 NASAL/SINUS ENDOSCOPY: ICD-10-PCS | Mod: S$GLB,,, | Performed by: OTOLARYNGOLOGY

## 2020-07-06 PROCEDURE — 3008F PR BODY MASS INDEX (BMI) DOCUMENTED: ICD-10-PCS | Mod: CPTII,S$GLB,, | Performed by: OTOLARYNGOLOGY

## 2020-07-06 RX ORDER — MUPIROCIN 20 MG/G
OINTMENT TOPICAL 2 TIMES DAILY
Qty: 22 G | Refills: 0 | Status: SHIPPED | OUTPATIENT
Start: 2020-07-06 | End: 2020-07-21

## 2020-07-06 RX ORDER — DIPHENHYDRAMINE HCL 25 MG
25 CAPSULE ORAL DAILY PRN
COMMUNITY
Start: 2020-06-26 | End: 2022-11-03

## 2020-07-06 RX ORDER — DEXAMETHASONE SODIUM PHOSPHATE 4 MG/ML
8 INJECTION, SOLUTION INTRA-ARTICULAR; INTRALESIONAL; INTRAMUSCULAR; INTRAVENOUS; SOFT TISSUE
Status: CANCELLED | OUTPATIENT
Start: 2020-07-06

## 2020-07-06 RX ORDER — OXYMETAZOLINE HCL 0.05 %
2 SPRAY, NON-AEROSOL (ML) NASAL
Status: CANCELLED | OUTPATIENT
Start: 2020-07-06

## 2020-07-06 RX ORDER — LIDOCAINE HYDROCHLORIDE 10 MG/ML
1 INJECTION, SOLUTION EPIDURAL; INFILTRATION; INTRACAUDAL; PERINEURAL ONCE
Status: CANCELLED | OUTPATIENT
Start: 2020-07-06 | End: 2020-07-06

## 2020-07-06 NOTE — PROGRESS NOTES
Subjective:      Cortez Napoles is a 45 y.o. male who was self-referred for nasal obstruction. Started having severe nasal congestion starting in February and had a cold after getting back from [facility] and since then has had bad nasal congestion. Using flonase for months with no significant benefit. Interfering with sleep. Never any issues with sinusitis/sinus infections. No discolored nasal drainage, no facial pain/pressure. Congestion alternatives sides of severity between left and right. He normally could breathe fairly well through nose without significant obstruction/congestion. He normally could sleep well but now can't sleep because of the congestion. He is using two sprays of flonase each side daily. Uses benadryl at night mainly because helps him sleep. Other symptoms include some itchy nose, sneezing, and post-nasal drip, but this is infrequent and it is primarily just congestion and nasal obstruction which is bothersome.    Current sinonasal medications as above.  The last course of antibiotics was recently when treated for possible sinus infection given these symptoms he is having, but no benefit from antibiotics. He was using navage nasal saline irrigations twice daily with no benefit. He tried Claritin D which helped decongest one side of his nose but caused his heart to race and kept him up all night  He does not regularly use nasal decongestant sprays.    He does not recall previously having allergy testing, which was reportedly all negative. He thought he might have mold issue in his apartment because there was fungus in wood moulding of walls but that was just last month and symptoms far before then. He denies a history of asthma. He relates a history of reflux symptoms which is currently managed with esomeprazole 20 mg which he only takes once every other day and controls heart burn symptoms.  He has not previously had an EGD. He denies a diagnosis of obstructive sleep apnea. He has not had  sinonasal surgery. He does not recall a prior history of nasal trauma.    QOL assessment deferred as currently not being collected due to Covid-19 transmission reduction protocols.    Past Medical History  He has no past medical history on file.    Past Surgical History  He has a past surgical history that includes Knee arthroscopy w/ meniscal repair and Fracture surgery (Left).    Family History  His family history includes Colon cancer in his maternal grandfather.    Social History  He reports that he has never smoked. He has never used smokeless tobacco. He reports current alcohol use. He reports that he does not use drugs.    Allergies  He has No Known Allergies.    Medications   He has a current medication list which includes the following prescription(s): diphenhydramine, fluticasone propionate, esomeprazole, and multivitamin.    Review of Systems  Review of Systems   Constitutional: Negative.  Negative for chills, fatigue and fever.   HENT: Positive for congestion, postnasal drip and sneezing. Negative for dental problem, ear discharge, ear pain, facial swelling, hearing loss, hoarse voice, nosebleeds, rhinorrhea, sinus pressure, sore throat, tinnitus, trouble swallowing and voice change.    Eyes: Negative.  Negative for photophobia, discharge, itching and visual disturbance.   Respiratory: Negative.  Negative for apnea, cough, shortness of breath and wheezing.    Cardiovascular: Negative.  Negative for chest pain and palpitations.   Gastrointestinal: Negative.  Negative for abdominal pain, nausea and vomiting.   Endocrine: Negative.  Negative for cold intolerance and heat intolerance.   Genitourinary: Negative.  Negative for difficulty urinating.   Musculoskeletal: Negative.  Negative for arthralgias, back pain, myalgias and neck pain.   Skin: Negative.  Negative for rash.   Allergic/Immunologic: Negative.  Negative for environmental allergies and food allergies.   Neurological: Negative.  Negative for  "dizziness, seizures, syncope, light-headedness and headaches.   Hematological: Negative.  Negative for adenopathy. Does not bruise/bleed easily.   Psychiatric/Behavioral: Positive for sleep disturbance. Negative for decreased concentration and dysphoric mood. The patient is not nervous/anxious.    All other systems reviewed and are negative.    Objective:     /82 (BP Location: Right arm, Patient Position: Sitting, BP Method: Large (Manual))   Temp 97.8 °F (36.6 °C)   Ht 6' 1" (1.854 m)   Wt 112.3 kg (247 lb 7.8 oz)   BMI 32.65 kg/m²        Constitutional:   Vital signs are normal. He appears well-developed and well-nourished. He does not appear ill. No distress. Normal speech.  No hoarse voice and breathy voice.      Head:  Normocephalic and atraumatic. No skin lesions. Salivary glands normal.  Facial strength is normal.      Ears:    Right Ear: No drainage, swelling or tenderness. No mastoid tenderness. Tympanic membrane is not injected, not perforated, not erythematous, not retracted and not bulging. No middle ear effusion.   Left Ear: No drainage, swelling or tenderness. No mastoid tenderness. Tympanic membrane is not injected, not perforated, not erythematous, not retracted and not bulging.  No middle ear effusion.     Nose:  Mucosal edema and septal deviation present. No rhinorrhea, sinus tenderness or polyps. No epistaxis. Turbinate hypertrophy.  Right sinus exhibits no maxillary sinus tenderness and no frontal sinus tenderness. Left sinus exhibits no maxillary sinus tenderness and no frontal sinus tenderness.     Mouth/Throat  Oropharynx clear and moist without lesions or asymmetry, normal uvula midline and lips, teeth, and gums normal. No oral lesions. No oropharyngeal exudate, posterior oropharyngeal edema or posterior oropharyngeal erythema.     Neck:  Neck normal without thyromegaly masses, asymmetry, normal tracheal structure, crepitus, and tenderness, trachea normal, phonation normal, full " range of motion with neck supple and no adenopathy. No edema and no erythema present.     Pulmonary/Chest:   Effort normal. No respiratory distress.     Psychiatric:   He has a normal mood and affect. His speech is normal and behavior is normal.     Neurological:   He has neurological normal, alert and oriented. No cranial nerve deficit or sensory deficit. Coordination and gait normal.     Skin:   No abrasions, lacerations, lesions, or rashes.       Procedure    Nasal endoscopy performed. Details of procedure described in separate procedure note.      Images obtained from endoscopy procedure today representing key findings (images also uploaded to media associated with patient's chart):     Left side: Before decongestant application              Right side: Before decongestant application            Left side: After decongestant application                          Right side: After decongestant application                      Data Reviewed    WBC (K/uL)   Date Value   07/15/2020 6.65     Eosinophil% (%)   Date Value   07/15/2020 0.3     Eos # (K/uL)   Date Value   07/15/2020 0.0     Platelets (K/uL)   Date Value   07/15/2020 201     Glucose (mg/dL)   Date Value   05/18/2020 82     IgE (IU/mL)   Date Value   04/29/2020 <35       No sinus imaging available.       Assessment:     1. Refractory obstruction of nasal airway    2. Nasal obstruction    3. Hypertrophy of nasal turbinates    4. Rhinitis, unspecified type    5. Nasal septal deviation    6. Nasal valve collapse    7. Nasal vestibulitis         Plan:     I had a long discussion with the patient regarding his condition and the further workup and management options.      Refractory nasal obstruction and congestion  - Discussed role for surgical interventions given refractory symptoms and exam/endoscopy findings despite adequate medical therapy including topical nasal steroid sprays and saline irrigations. These potential sinonasal surgical interventions would  include inferior turbinate reduction and septoplasty. Discussed that this would address partial obstruction and congestion primarily, but would not be directly anticipated to impact symptoms of post-nasal drip, sneezing, and other symptoms appearing to be of inflammatory nature, though could secondarily improve additional symptoms by improving nasal drug and rinse delivery. The likely need for ongoing medical management to treat inflammatory etiologies of symptoms despite possible surgery was also reviewed, emphasizing that surgical procedures would address physical causes of obstruction and congestion and serve as an adjunct rather than replacement of medical management. He is in understanding of this and interested in proceeding with surgery at this point.   - He would benefit from inferior turbinate reduction and septoplasty for the treatment of his condition and would be bilateral. I discussed the risks, benefits and alternatives to surgery with the patient, as well as the expected postoperative course.  I gave him the opportunity to ask questions and I answered all of them. I provided relevant printed information on his condition for him to review at home. Same-day discharge is anticipated.  He will need evaluation in the pre-anesthesia clinic.   The surgery will be tentatively scheduled for 7/21/2020.  He will need to return for a postoperative visit 1 and 4 weeks after surgery.    Rhinitis/vestibulitis  - For treatment of anterior nasal irritation, nasal vestibulitis, and related anterior nasal crusting, and I have prescribed mupirocin 2% ointment. Instructions on use were provided, specifically to apply a pea size amount inside each side of the nose twice daily to keep the front of the nose moist and coated to help it heal and prevent it from becoming dry, irritated, and potentially bleeding.   - Recommended to begin using nasal saline spray to keep the nose moist. Examples of nasal saline spray to obtain  over-the-counter were provided.  - Recommended to begin using using nasal saline gel to keep the nose moist after finnishing the prescribed mupirocin ointment. Examples of nasal saline gel to obtain over-the-counter were provided.  -- mupirocin (BACTROBAN) 2 % ointment; by Nasal route 2 (two) times daily. Apply to each nostril for 14 days  Dispense: 22 g; Refill: 0    He voiced understanding of this discussion and instructions for management, and all of his questions were answered. I have recommended follow-up for post-operative evaluation after planned procedures and this has been arranged and scheduled appropriately for the anticipated date of surgery. I have encouraged him to call for any questions or concerns in the meantime.     Amanuel Banegas MD    Orders Placed This Encounter   Procedures    Diet NPO    Full code    Nasal/sinus endoscopy    Insert peripheral IV           Amanuel Banegas MD    Rhinology, Allergy, and Sinus-Skull Base Surgery    Department of Otorhinolaryngology    [facility]    Phone  [phone]    Fax      [phone]

## 2020-07-06 NOTE — H&P (VIEW-ONLY)
Subjective:      Cortez Napoles is a 45 y.o. male who was self-referred for nasal obstruction. Started having severe nasal congestion starting in February and had a cold after getting back from [facility] and since then has had bad nasal congestion. Using flonase for months with no significant benefit. Interfering with sleep. Never any issues with sinusitis/sinus infections. No discolored nasal drainage, no facial pain/pressure. Congestion alternatives sides of severity between left and right. He normally could breathe fairly well through nose without significant obstruction/congestion. He normally could sleep well but now can't sleep because of the congestion. He is using two sprays of flonase each side daily. Uses benadryl at night mainly because helps him sleep. Other symptoms include some itchy nose, sneezing, and post-nasal drip, but this is infrequent and it is primarily just congestion and nasal obstruction which is bothersome.    Current sinonasal medications as above.  The last course of antibiotics was recently when treated for possible sinus infection given these symptoms he is having, but no benefit from antibiotics. He was using navage nasal saline irrigations twice daily with no benefit. He tried Claritin D which helped decongest one side of his nose but caused his heart to race and kept him up all night  He does not regularly use nasal decongestant sprays.    He does not recall previously having allergy testing, which was reportedly all negative. He thought he might have mold issue in his apartment because there was fungus in wood moulding of walls but that was just last month and symptoms far before then. He denies a history of asthma. He relates a history of reflux symptoms which is currently managed with esomeprazole 20 mg which he only takes once every other day and controls heart burn symptoms.  He has not previously had an EGD. He denies a diagnosis of obstructive sleep apnea. He has not had  sinonasal surgery. He does not recall a prior history of nasal trauma.    QOL assessment deferred as currently not being collected due to Covid-19 transmission reduction protocols.    Past Medical History  He has no past medical history on file.    Past Surgical History  He has a past surgical history that includes Knee arthroscopy w/ meniscal repair and Fracture surgery (Left).    Family History  His family history includes Colon cancer in his maternal grandfather.    Social History  He reports that he has never smoked. He has never used smokeless tobacco. He reports current alcohol use. He reports that he does not use drugs.    Allergies  He has No Known Allergies.    Medications   He has a current medication list which includes the following prescription(s): diphenhydramine, fluticasone propionate, esomeprazole, and multivitamin.    Review of Systems  Review of Systems   Constitutional: Negative.  Negative for chills, fatigue and fever.   HENT: Positive for congestion, postnasal drip and sneezing. Negative for dental problem, ear discharge, ear pain, facial swelling, hearing loss, hoarse voice, nosebleeds, rhinorrhea, sinus pressure, sore throat, tinnitus, trouble swallowing and voice change.    Eyes: Negative.  Negative for photophobia, discharge, itching and visual disturbance.   Respiratory: Negative.  Negative for apnea, cough, shortness of breath and wheezing.    Cardiovascular: Negative.  Negative for chest pain and palpitations.   Gastrointestinal: Negative.  Negative for abdominal pain, nausea and vomiting.   Endocrine: Negative.  Negative for cold intolerance and heat intolerance.   Genitourinary: Negative.  Negative for difficulty urinating.   Musculoskeletal: Negative.  Negative for arthralgias, back pain, myalgias and neck pain.   Skin: Negative.  Negative for rash.   Allergic/Immunologic: Negative.  Negative for environmental allergies and food allergies.   Neurological: Negative.  Negative for  "dizziness, seizures, syncope, light-headedness and headaches.   Hematological: Negative.  Negative for adenopathy. Does not bruise/bleed easily.   Psychiatric/Behavioral: Positive for sleep disturbance. Negative for decreased concentration and dysphoric mood. The patient is not nervous/anxious.    All other systems reviewed and are negative.    Objective:     /82 (BP Location: Right arm, Patient Position: Sitting, BP Method: Large (Manual))   Temp 97.8 °F (36.6 °C)   Ht 6' 1" (1.854 m)   Wt 112.3 kg (247 lb 7.8 oz)   BMI 32.65 kg/m²        Constitutional:   Vital signs are normal. He appears well-developed and well-nourished. He does not appear ill. No distress. Normal speech.  No hoarse voice and breathy voice.      Head:  Normocephalic and atraumatic. No skin lesions. Salivary glands normal.  Facial strength is normal.      Ears:    Right Ear: No drainage, swelling or tenderness. No mastoid tenderness. Tympanic membrane is not injected, not perforated, not erythematous, not retracted and not bulging. No middle ear effusion.   Left Ear: No drainage, swelling or tenderness. No mastoid tenderness. Tympanic membrane is not injected, not perforated, not erythematous, not retracted and not bulging.  No middle ear effusion.     Nose:  Mucosal edema and septal deviation present. No rhinorrhea, sinus tenderness or polyps. No epistaxis. Turbinate hypertrophy.  Right sinus exhibits no maxillary sinus tenderness and no frontal sinus tenderness. Left sinus exhibits no maxillary sinus tenderness and no frontal sinus tenderness.     Mouth/Throat  Oropharynx clear and moist without lesions or asymmetry, normal uvula midline and lips, teeth, and gums normal. No oral lesions. No oropharyngeal exudate, posterior oropharyngeal edema or posterior oropharyngeal erythema.     Neck:  Neck normal without thyromegaly masses, asymmetry, normal tracheal structure, crepitus, and tenderness, trachea normal, phonation normal, full " range of motion with neck supple and no adenopathy. No edema and no erythema present.     Pulmonary/Chest:   Effort normal. No respiratory distress.     Psychiatric:   He has a normal mood and affect. His speech is normal and behavior is normal.     Neurological:   He has neurological normal, alert and oriented. No cranial nerve deficit or sensory deficit. Coordination and gait normal.     Skin:   No abrasions, lacerations, lesions, or rashes.       Procedure    Nasal endoscopy performed. Details of procedure described in separate procedure note.      Images obtained from endoscopy procedure today representing key findings (images also uploaded to media associated with patient's chart):     Left side: Before decongestant application              Right side: Before decongestant application            Left side: After decongestant application                          Right side: After decongestant application                      Data Reviewed    WBC (K/uL)   Date Value   07/15/2020 6.65     Eosinophil% (%)   Date Value   07/15/2020 0.3     Eos # (K/uL)   Date Value   07/15/2020 0.0     Platelets (K/uL)   Date Value   07/15/2020 201     Glucose (mg/dL)   Date Value   05/18/2020 82     IgE (IU/mL)   Date Value   04/29/2020 <35       No sinus imaging available.       Assessment:     1. Refractory obstruction of nasal airway    2. Nasal obstruction    3. Hypertrophy of nasal turbinates    4. Rhinitis, unspecified type    5. Nasal septal deviation    6. Nasal valve collapse    7. Nasal vestibulitis         Plan:     I had a long discussion with the patient regarding his condition and the further workup and management options.      Refractory nasal obstruction and congestion  - Discussed role for surgical interventions given refractory symptoms and exam/endoscopy findings despite adequate medical therapy including topical nasal steroid sprays and saline irrigations. These potential sinonasal surgical interventions would  include inferior turbinate reduction and septoplasty. Discussed that this would address partial obstruction and congestion primarily, but would not be directly anticipated to impact symptoms of post-nasal drip, sneezing, and other symptoms appearing to be of inflammatory nature, though could secondarily improve additional symptoms by improving nasal drug and rinse delivery. The likely need for ongoing medical management to treat inflammatory etiologies of symptoms despite possible surgery was also reviewed, emphasizing that surgical procedures would address physical causes of obstruction and congestion and serve as an adjunct rather than replacement of medical management. He is in understanding of this and interested in proceeding with surgery at this point.   - He would benefit from inferior turbinate reduction and septoplasty for the treatment of his condition and would be bilateral. I discussed the risks, benefits and alternatives to surgery with the patient, as well as the expected postoperative course.  I gave him the opportunity to ask questions and I answered all of them. I provided relevant printed information on his condition for him to review at home. Same-day discharge is anticipated.  He will need evaluation in the pre-anesthesia clinic.   The surgery will be tentatively scheduled for 7/21/2020.  He will need to return for a postoperative visit 1 and 4 weeks after surgery.    Rhinitis/vestibulitis  - For treatment of anterior nasal irritation, nasal vestibulitis, and related anterior nasal crusting, and I have prescribed mupirocin 2% ointment. Instructions on use were provided, specifically to apply a pea size amount inside each side of the nose twice daily to keep the front of the nose moist and coated to help it heal and prevent it from becoming dry, irritated, and potentially bleeding.   - Recommended to begin using nasal saline spray to keep the nose moist. Examples of nasal saline spray to obtain  over-the-counter were provided.  - Recommended to begin using using nasal saline gel to keep the nose moist after finnishing the prescribed mupirocin ointment. Examples of nasal saline gel to obtain over-the-counter were provided.  -- mupirocin (BACTROBAN) 2 % ointment; by Nasal route 2 (two) times daily. Apply to each nostril for 14 days  Dispense: 22 g; Refill: 0    He voiced understanding of this discussion and instructions for management, and all of his questions were answered. I have recommended follow-up for post-operative evaluation after planned procedures and this has been arranged and scheduled appropriately for the anticipated date of surgery. I have encouraged him to call for any questions or concerns in the meantime.     Amanuel Banegas MD    Orders Placed This Encounter   Procedures    Diet NPO    Full code    Nasal/sinus endoscopy    Insert peripheral IV           Amanuel Banegas MD    Rhinology, Allergy, and Sinus-Skull Base Surgery    Department of Otorhinolaryngology    [facility]    Phone  [phone]    Fax      [phone]

## 2020-07-06 NOTE — PATIENT INSTRUCTIONS
Information and instructions from your visit with me today:    Prescriptions sent to your pharmacy today:  Mupirocin ointment: Apply a pea size amount inside each side of the nose 2-3 times per day to keep the front of the nose moist and coated to help it heal and prevent it from becoming dry. You should use this for 2 weeks at least, but can continue using until you have used all of the tube of this ointment.        Over-the-counter medications:    SALINE NASAL SPRAY (any brand, such as ocean saline spray or simply saline): Begin using nasal saline spray, 3 sprays every 1-3 hours as needed while awake to keep the nose moist and help the packing material stay moist and gently dissolve. Keeping the packing moist will also help remove this at your first visit. Examples of nasal saline spray:          Additional options for moisturizing the nose: You may chose to also use these after any other ointment which has been recommended (e.g. Mupirocin/Bactroban ointment).    SALINE NASAL GEL: You can begin using nasal saline gel to keep the nose moist after finnishing the prescribed mupirocin ointment. You may not need to or if there is significant crusting and dryness you may wish to add these between use of the mupirocin ointment as optional additional treatments to moisturize the front of the nose and help it heal. Examples of nasal saline gel:           SALINE SINUS RINSE ([employer] brand): You should do a full bottle, half on one side of your nose and half on the other, 1-2 times per day (or more if able to, you cannot do this too much). Follow the instructions on the box: mix the salt packet with clean water (bottle, previously boiled, distilled, etc -- not tap water) to the line on the bottle to make the irrigation.      It was nice meeting you today, and I look forward to helping you feel better soon. Please don't hesitate to call if you have any other questions or concerns, or if I can be of any assistance in the  meantime.       Amanuel Banegas    [facility]    Phone  [phone]    Fax      [phone]        Amanuel Banegas MD  Rhinology, Sinus, and Skull Base Surgery  Department of Otorhinolaryngology          Amanuel Banegas MD  Rhinology, Sinus, and Skull Base Surgery  Department of Otorhinolaryngology

## 2020-07-15 ENCOUNTER — HOSPITAL ENCOUNTER (OUTPATIENT)
Dept: PREADMISSION TESTING | Facility: HOSPITAL | Age: 45
Discharge: HOME | End: 2020-07-15
Attending: SURGERY
Payer: COMMERCIAL

## 2020-07-15 VITALS
WEIGHT: 248.88 LBS | BODY MASS INDEX: 32.98 KG/M2 | RESPIRATION RATE: 17 BRPM | HEIGHT: 73 IN | TEMPERATURE: 97 F | OXYGEN SATURATION: 99 % | SYSTOLIC BLOOD PRESSURE: 127 MMHG | DIASTOLIC BLOOD PRESSURE: 83 MMHG | HEART RATE: 68 BPM

## 2020-07-15 DIAGNOSIS — Z01.818 PRE-OP TESTING: Primary | ICD-10-CM

## 2020-07-15 LAB
BASOPHILS # BLD AUTO: 0.04 K/UL (ref 0–0.2)
BASOPHILS NFR BLD: 0.6 % (ref 0–1.9)
DIFFERENTIAL METHOD: NORMAL
EOSINOPHIL # BLD AUTO: 0 K/UL (ref 0–0.5)
EOSINOPHIL NFR BLD: 0.3 % (ref 0–8)
ERYTHROCYTE [DISTWIDTH] IN BLOOD BY AUTOMATED COUNT: 13.6 % (ref 11.5–14.5)
HCT VFR BLD AUTO: 46 % (ref 40–54)
HGB BLD-MCNC: 15.9 G/DL (ref 14–18)
IMM GRANULOCYTES # BLD AUTO: 0.02 K/UL (ref 0–0.04)
IMM GRANULOCYTES NFR BLD AUTO: 0.3 % (ref 0–0.5)
LYMPHOCYTES # BLD AUTO: 2.5 K/UL (ref 1–4.8)
LYMPHOCYTES NFR BLD: 37.4 % (ref 18–48)
MCH RBC QN AUTO: 31 PG (ref 27–31)
MCHC RBC AUTO-ENTMCNC: 34.6 G/DL (ref 32–36)
MCV RBC AUTO: 90 FL (ref 82–98)
MONOCYTES # BLD AUTO: 0.4 K/UL (ref 0.3–1)
MONOCYTES NFR BLD: 6.6 % (ref 4–15)
NEUTROPHILS # BLD AUTO: 3.6 K/UL (ref 1.8–7.7)
NEUTROPHILS NFR BLD: 54.8 % (ref 38–73)
NRBC BLD-RTO: 0 /100 WBC
PLATELET # BLD AUTO: 201 K/UL (ref 150–350)
PMV BLD AUTO: 10.5 FL (ref 9.2–12.9)
RBC # BLD AUTO: 5.13 M/UL (ref 4.6–6.2)
WBC # BLD AUTO: 6.65 K/UL (ref 3.9–12.7)

## 2020-07-15 PROCEDURE — 85025 COMPLETE CBC W/AUTO DIFF WBC: CPT

## 2020-07-15 RX ORDER — MULTIVITAMIN
1 TABLET ORAL DAILY
COMMUNITY
End: 2021-06-28

## 2020-07-15 NOTE — DISCHARGE INSTRUCTIONS
Your surgery is scheduled for __Tuesday July 21, 2020__________________.    Call [phone] between 2 p.m. and 5 p.m. on   _Monday _ to find out your arrival time for the day of your surgery.                                  Emergency Room  Please report to SAME DAY SURGERY UNIT on the 2nd FLOOR at _______ a.m.      INSTRUCTIONS IMPORTANT!!!  ¨ Do not eat or drink after 12 midnight-including water. OK to brush teeth, no   gum, candy or mints!            _x___  Return to Hospital Lab on _Monday July 20, 2020 at 11 AM_for Covid test.    _x___  Prep instructions:    SHOWER    _x___  Please shower using Hibiclens soap the night before AND  the morning of                  your surgery/procedure. Do not use Hibiclens on your face or genitals     _x___  No powder, lotions or creams to your body.  x____  You may wear only deodorant on the day of surgery.  _x___  Please remove all jewelry, including piercings and leave at home.  _x___  No money or valuables needed. Please leave at home.  You may bring your cell phone.  ____  Please bring any documents given by your doctor.  _x___  If going home the same day, arrange for a ride home. You will not be able to   drive if Anesthesia was used.    _x___  Wear loose fitting clothing. Allow for dressings, bandages.  _x___  Stop Aspirin, Ibuprofen, Motrin and Aleve at least 3-5 days before  surgery, unless otherwise instructed by your doctor, or the nurse.              You MAY use Tylenol/acetaminophen until day of surgery.    _x___  Call MD for temperature above 101 degrees.        _x___ Stop taking any Fish Oil supplement or any Vitamins that contain Vitamin E at least 5 days prior to surgery.          I have read or had read and explained to me, and understand the above information.  Additional comments or instructions:Please call   [phone] if you have any questions regarding the instructions above.

## 2020-07-18 ENCOUNTER — ANESTHESIA EVENT (OUTPATIENT)
Dept: SURGERY | Facility: HOSPITAL | Age: 45
End: 2020-07-18
Payer: COMMERCIAL

## 2020-07-20 ENCOUNTER — HOSPITAL ENCOUNTER (OUTPATIENT)
Dept: PREADMISSION TESTING | Facility: HOSPITAL | Age: 45
Discharge: HOME | End: 2020-07-20
Attending: OTOLARYNGOLOGY
Payer: COMMERCIAL

## 2020-07-20 DIAGNOSIS — Z01.818 PRE-OP TESTING: ICD-10-CM

## 2020-07-20 LAB — SARS-COV-2 RDRP RESP QL NAA+PROBE: NEGATIVE

## 2020-07-20 PROCEDURE — U0002 COVID-19 LAB TEST NON-CDC: HCPCS

## 2020-07-21 ENCOUNTER — ANESTHESIA (OUTPATIENT)
Dept: SURGERY | Facility: HOSPITAL | Age: 45
End: 2020-07-21
Payer: COMMERCIAL

## 2020-07-21 ENCOUNTER — HOSPITAL ENCOUNTER (OUTPATIENT)
Facility: HOSPITAL | Age: 45
Discharge: HOME | End: 2020-07-21
Attending: OTOLARYNGOLOGY | Admitting: OTOLARYNGOLOGY
Payer: COMMERCIAL

## 2020-07-21 VITALS
TEMPERATURE: 98 F | WEIGHT: 248.88 LBS | OXYGEN SATURATION: 99 % | SYSTOLIC BLOOD PRESSURE: 154 MMHG | HEART RATE: 84 BPM | BODY MASS INDEX: 32.84 KG/M2 | DIASTOLIC BLOOD PRESSURE: 80 MMHG | RESPIRATION RATE: 18 BRPM

## 2020-07-21 DIAGNOSIS — Z01.818 PRE-OP TESTING: ICD-10-CM

## 2020-07-21 DIAGNOSIS — J34.89 LESION OF NASAL SEPTUM: ICD-10-CM

## 2020-07-21 DIAGNOSIS — M95.0 NASAL VALVE COLLAPSE: ICD-10-CM

## 2020-07-21 DIAGNOSIS — J34.2 NASAL SEPTAL DEVIATION: ICD-10-CM

## 2020-07-21 DIAGNOSIS — J34.89 REFRACTORY OBSTRUCTION OF NASAL AIRWAY: Primary | ICD-10-CM

## 2020-07-21 DIAGNOSIS — J34.3 HYPERTROPHY OF NASAL TURBINATES: ICD-10-CM

## 2020-07-21 DIAGNOSIS — J31.0 CHRONIC RHINITIS: ICD-10-CM

## 2020-07-21 PROBLEM — J34.829 NASAL VALVE COLLAPSE: Status: ACTIVE | Noted: 2020-07-21

## 2020-07-21 PROCEDURE — 25000003 PHARM REV CODE 250: Performed by: ANESTHESIOLOGY

## 2020-07-21 PROCEDURE — 30117 REMOVAL OF INTRANASAL LESION: CPT | Mod: 51,,, | Performed by: OTOLARYNGOLOGY

## 2020-07-21 PROCEDURE — 25000003 PHARM REV CODE 250: Performed by: OTOLARYNGOLOGY

## 2020-07-21 PROCEDURE — 27201423 OPTIME MED/SURG SUP & DEVICES STERILE SUPPLY: Performed by: OTOLARYNGOLOGY

## 2020-07-21 PROCEDURE — 25000003 PHARM REV CODE 250: Performed by: REGISTERED NURSE

## 2020-07-21 PROCEDURE — 63600175 PHARM REV CODE 636 W HCPCS: Performed by: ANESTHESIOLOGY

## 2020-07-21 PROCEDURE — 63600175 PHARM REV CODE 636 W HCPCS: Performed by: OTOLARYNGOLOGY

## 2020-07-21 PROCEDURE — 63600175 PHARM REV CODE 636 W HCPCS: Performed by: REGISTERED NURSE

## 2020-07-21 PROCEDURE — 30140 RESECT INFERIOR TURBINATE: CPT | Mod: 50,51,, | Performed by: OTOLARYNGOLOGY

## 2020-07-21 PROCEDURE — 30520 PR REPAIR, NASAL SEPTUM: ICD-10-PCS | Mod: ,,, | Performed by: OTOLARYNGOLOGY

## 2020-07-21 PROCEDURE — 36000708 HC OR TIME LEV III 1ST 15 MIN: Performed by: OTOLARYNGOLOGY

## 2020-07-21 PROCEDURE — 37000008 HC ANESTHESIA 1ST 15 MINUTES: Performed by: OTOLARYNGOLOGY

## 2020-07-21 PROCEDURE — 30140 PR EXCISION TURBINATE,SUBMUCOUS: ICD-10-PCS | Mod: 50,51,, | Performed by: OTOLARYNGOLOGY

## 2020-07-21 PROCEDURE — 37000009 HC ANESTHESIA EA ADD 15 MINS: Performed by: OTOLARYNGOLOGY

## 2020-07-21 PROCEDURE — 71000015 HC POSTOP RECOV 1ST HR: Performed by: OTOLARYNGOLOGY

## 2020-07-21 PROCEDURE — 71000039 HC RECOVERY, EACH ADD'L HOUR: Performed by: OTOLARYNGOLOGY

## 2020-07-21 PROCEDURE — 30117 PR EXCIS/DEST INTRANAS LESION; INT APP: ICD-10-PCS | Mod: 51,,, | Performed by: OTOLARYNGOLOGY

## 2020-07-21 PROCEDURE — D9220A PRA ANESTHESIA: ICD-10-PCS | Mod: ANES,,, | Performed by: ANESTHESIOLOGY

## 2020-07-21 PROCEDURE — C1894 INTRO/SHEATH, NON-LASER: HCPCS | Performed by: OTOLARYNGOLOGY

## 2020-07-21 PROCEDURE — 71000033 HC RECOVERY, INTIAL HOUR: Performed by: OTOLARYNGOLOGY

## 2020-07-21 PROCEDURE — 30520 REPAIR OF NASAL SEPTUM: CPT | Mod: ,,, | Performed by: OTOLARYNGOLOGY

## 2020-07-21 PROCEDURE — D9220A PRA ANESTHESIA: Mod: CRNA,,, | Performed by: REGISTERED NURSE

## 2020-07-21 PROCEDURE — 71000016 HC POSTOP RECOV ADDL HR: Performed by: OTOLARYNGOLOGY

## 2020-07-21 PROCEDURE — 36000709 HC OR TIME LEV III EA ADD 15 MIN: Performed by: OTOLARYNGOLOGY

## 2020-07-21 PROCEDURE — D9220A PRA ANESTHESIA: Mod: ANES,,, | Performed by: ANESTHESIOLOGY

## 2020-07-21 PROCEDURE — D9220A PRA ANESTHESIA: ICD-10-PCS | Mod: CRNA,,, | Performed by: REGISTERED NURSE

## 2020-07-21 RX ORDER — MIDAZOLAM HYDROCHLORIDE 1 MG/ML
INJECTION, SOLUTION INTRAMUSCULAR; INTRAVENOUS
Status: DISCONTINUED | OUTPATIENT
Start: 2020-07-21 | End: 2020-07-21

## 2020-07-21 RX ORDER — HYDRALAZINE HYDROCHLORIDE 20 MG/ML
INJECTION INTRAMUSCULAR; INTRAVENOUS
Status: DISCONTINUED | OUTPATIENT
Start: 2020-07-21 | End: 2020-07-21

## 2020-07-21 RX ORDER — LIDOCAINE HYDROCHLORIDE AND EPINEPHRINE 10; 10 MG/ML; UG/ML
INJECTION, SOLUTION INFILTRATION; PERINEURAL
Status: DISCONTINUED | OUTPATIENT
Start: 2020-07-21 | End: 2020-07-21 | Stop reason: HOSPADM

## 2020-07-21 RX ORDER — SODIUM CHLORIDE 0.9 % (FLUSH) 0.9 %
10 SYRINGE (ML) INJECTION
Status: DISCONTINUED | OUTPATIENT
Start: 2020-07-21 | End: 2020-07-21 | Stop reason: HOSPADM

## 2020-07-21 RX ORDER — SODIUM CHLORIDE, SODIUM LACTATE, POTASSIUM CHLORIDE, CALCIUM CHLORIDE 600; 310; 30; 20 MG/100ML; MG/100ML; MG/100ML; MG/100ML
INJECTION, SOLUTION INTRAVENOUS CONTINUOUS
Status: DISCONTINUED | OUTPATIENT
Start: 2020-07-21 | End: 2020-07-21 | Stop reason: HOSPADM

## 2020-07-21 RX ORDER — OXYMETAZOLINE HCL 0.05 %
2 SPRAY, NON-AEROSOL (ML) NASAL
Status: COMPLETED | OUTPATIENT
Start: 2020-07-21 | End: 2020-07-21

## 2020-07-21 RX ORDER — ACETAMINOPHEN 10 MG/ML
1000 INJECTION, SOLUTION INTRAVENOUS ONCE
Status: COMPLETED | OUTPATIENT
Start: 2020-07-21 | End: 2020-07-21

## 2020-07-21 RX ORDER — FENTANYL CITRATE 50 UG/ML
INJECTION, SOLUTION INTRAMUSCULAR; INTRAVENOUS
Status: DISCONTINUED | OUTPATIENT
Start: 2020-07-21 | End: 2020-07-21

## 2020-07-21 RX ORDER — METOPROLOL TARTRATE 1 MG/ML
INJECTION, SOLUTION INTRAVENOUS
Status: DISCONTINUED | OUTPATIENT
Start: 2020-07-21 | End: 2020-07-21

## 2020-07-21 RX ORDER — LIDOCAINE HYDROCHLORIDE 10 MG/ML
1 INJECTION, SOLUTION EPIDURAL; INFILTRATION; INTRACAUDAL; PERINEURAL ONCE
Status: COMPLETED | OUTPATIENT
Start: 2020-07-21 | End: 2020-07-21

## 2020-07-21 RX ORDER — IBUPROFEN 600 MG/1
600 TABLET ORAL EVERY 6 HOURS PRN
Qty: 30 TABLET | Refills: 1 | Status: SHIPPED | OUTPATIENT
Start: 2020-07-21 | End: 2022-11-03

## 2020-07-21 RX ORDER — ONDANSETRON 2 MG/ML
INJECTION INTRAMUSCULAR; INTRAVENOUS
Status: DISCONTINUED | OUTPATIENT
Start: 2020-07-21 | End: 2020-07-21

## 2020-07-21 RX ORDER — HYDROMORPHONE HYDROCHLORIDE 2 MG/ML
0.2 INJECTION, SOLUTION INTRAMUSCULAR; INTRAVENOUS; SUBCUTANEOUS EVERY 5 MIN PRN
Status: DISCONTINUED | OUTPATIENT
Start: 2020-07-21 | End: 2020-07-21 | Stop reason: HOSPADM

## 2020-07-21 RX ORDER — LIDOCAINE HYDROCHLORIDE 10 MG/ML
1 INJECTION, SOLUTION EPIDURAL; INFILTRATION; INTRACAUDAL; PERINEURAL ONCE
Status: DISCONTINUED | OUTPATIENT
Start: 2020-07-21 | End: 2020-07-21 | Stop reason: HOSPADM

## 2020-07-21 RX ORDER — MUPIROCIN 20 MG/G
OINTMENT TOPICAL
Status: DISCONTINUED | OUTPATIENT
Start: 2020-07-21 | End: 2020-07-21 | Stop reason: HOSPADM

## 2020-07-21 RX ORDER — ONDANSETRON 8 MG/1
8 TABLET, ORALLY DISINTEGRATING ORAL EVERY 6 HOURS PRN
Qty: 15 TABLET | Refills: 0 | Status: SHIPPED | OUTPATIENT
Start: 2020-07-21 | End: 2021-06-28

## 2020-07-21 RX ORDER — EPINEPHRINE NASAL SOLUTION 1 MG/ML
SOLUTION NASAL
Status: DISCONTINUED | OUTPATIENT
Start: 2020-07-21 | End: 2020-07-21 | Stop reason: HOSPADM

## 2020-07-21 RX ORDER — LABETALOL HYDROCHLORIDE 5 MG/ML
INJECTION, SOLUTION INTRAVENOUS
Status: DISCONTINUED | OUTPATIENT
Start: 2020-07-21 | End: 2020-07-21

## 2020-07-21 RX ORDER — MUPIROCIN 20 MG/G
OINTMENT TOPICAL 2 TIMES DAILY
Qty: 22 G | Refills: 0 | Status: SHIPPED | OUTPATIENT
Start: 2020-07-21 | End: 2020-08-04

## 2020-07-21 RX ORDER — ACETAMINOPHEN 325 MG/1
650 TABLET ORAL EVERY 4 HOURS PRN
Status: DISCONTINUED | OUTPATIENT
Start: 2020-07-21 | End: 2020-07-21 | Stop reason: HOSPADM

## 2020-07-21 RX ORDER — METHYLPREDNISOLONE 4 MG/1
TABLET ORAL
Qty: 1 PACKAGE | Refills: 0 | Status: SHIPPED | OUTPATIENT
Start: 2020-07-21 | End: 2021-06-28

## 2020-07-21 RX ORDER — ONDANSETRON 4 MG/1
8 TABLET, ORALLY DISINTEGRATING ORAL ONCE
Status: COMPLETED | OUTPATIENT
Start: 2020-07-21 | End: 2020-07-21

## 2020-07-21 RX ORDER — GLYCOPYRROLATE 0.2 MG/ML
INJECTION INTRAMUSCULAR; INTRAVENOUS
Status: DISCONTINUED | OUTPATIENT
Start: 2020-07-21 | End: 2020-07-21

## 2020-07-21 RX ORDER — SUCCINYLCHOLINE CHLORIDE 20 MG/ML
INJECTION INTRAMUSCULAR; INTRAVENOUS
Status: DISCONTINUED | OUTPATIENT
Start: 2020-07-21 | End: 2020-07-21

## 2020-07-21 RX ORDER — OXYCODONE HYDROCHLORIDE 5 MG/1
5 TABLET ORAL EVERY 4 HOURS PRN
Qty: 10 TABLET | Refills: 0 | Status: SHIPPED | OUTPATIENT
Start: 2020-07-21 | End: 2021-06-28

## 2020-07-21 RX ORDER — PROPOFOL 10 MG/ML
VIAL (ML) INTRAVENOUS
Status: DISCONTINUED | OUTPATIENT
Start: 2020-07-21 | End: 2020-07-21

## 2020-07-21 RX ORDER — LIDOCAINE HYDROCHLORIDE 20 MG/ML
INJECTION INTRAVENOUS
Status: DISCONTINUED | OUTPATIENT
Start: 2020-07-21 | End: 2020-07-21

## 2020-07-21 RX ORDER — DEXAMETHASONE SODIUM PHOSPHATE 4 MG/ML
8 INJECTION, SOLUTION INTRA-ARTICULAR; INTRALESIONAL; INTRAMUSCULAR; INTRAVENOUS; SOFT TISSUE
Status: DISCONTINUED | OUTPATIENT
Start: 2020-07-21 | End: 2020-07-21 | Stop reason: HOSPADM

## 2020-07-21 RX ORDER — CEFAZOLIN SODIUM 2 G/50ML
2 SOLUTION INTRAVENOUS
Status: COMPLETED | OUTPATIENT
Start: 2020-07-21 | End: 2020-07-21

## 2020-07-21 RX ORDER — ROCURONIUM BROMIDE 10 MG/ML
INJECTION, SOLUTION INTRAVENOUS
Status: DISCONTINUED | OUTPATIENT
Start: 2020-07-21 | End: 2020-07-21

## 2020-07-21 RX ORDER — AMOXICILLIN AND CLAVULANATE POTASSIUM 875; 125 MG/1; MG/1
1 TABLET, FILM COATED ORAL 2 TIMES DAILY
Qty: 14 TABLET | Refills: 0 | Status: SHIPPED | OUTPATIENT
Start: 2020-07-21 | End: 2020-07-28

## 2020-07-21 RX ORDER — OXYCODONE HYDROCHLORIDE 5 MG/1
5 TABLET ORAL EVERY 4 HOURS PRN
Status: DISCONTINUED | OUTPATIENT
Start: 2020-07-21 | End: 2020-07-21 | Stop reason: HOSPADM

## 2020-07-21 RX ADMIN — Medication 2 SPRAY: at 11:07

## 2020-07-21 RX ADMIN — GLYCOPYRROLATE 0.1 MG: 0.2 INJECTION, SOLUTION INTRAMUSCULAR; INTRAVENOUS at 01:07

## 2020-07-21 RX ADMIN — HYDROMORPHONE HYDROCHLORIDE 0.2 MG: 2 INJECTION, SOLUTION INTRAMUSCULAR; INTRAVENOUS; SUBCUTANEOUS at 05:07

## 2020-07-21 RX ADMIN — Medication 2 SPRAY: at 10:07

## 2020-07-21 RX ADMIN — ROCURONIUM BROMIDE 20 MG: 10 INJECTION, SOLUTION INTRAVENOUS at 01:07

## 2020-07-21 RX ADMIN — ACETAMINOPHEN 1000 MG: 10 INJECTION, SOLUTION INTRAVENOUS at 04:07

## 2020-07-21 RX ADMIN — DEXAMETHASONE SODIUM PHOSPHATE 8 MG: 4 INJECTION, SOLUTION INTRAMUSCULAR; INTRAVENOUS at 01:07

## 2020-07-21 RX ADMIN — SUGAMMADEX 200 MG: 100 INJECTION, SOLUTION INTRAVENOUS at 04:07

## 2020-07-21 RX ADMIN — LABETALOL HYDROCHLORIDE 5 MG: 5 INJECTION, SOLUTION INTRAVENOUS at 03:07

## 2020-07-21 RX ADMIN — HYDRALAZINE HYDROCHLORIDE 5 MG: 20 INJECTION INTRAMUSCULAR; INTRAVENOUS at 03:07

## 2020-07-21 RX ADMIN — SODIUM CHLORIDE, SODIUM LACTATE, POTASSIUM CHLORIDE, AND CALCIUM CHLORIDE: .6; .31; .03; .02 INJECTION, SOLUTION INTRAVENOUS at 03:07

## 2020-07-21 RX ADMIN — SODIUM CHLORIDE, SODIUM LACTATE, POTASSIUM CHLORIDE, AND CALCIUM CHLORIDE: .6; .31; .03; .02 INJECTION, SOLUTION INTRAVENOUS at 12:07

## 2020-07-21 RX ADMIN — MIDAZOLAM HYDROCHLORIDE 2 MG: 1 INJECTION, SOLUTION INTRAMUSCULAR; INTRAVENOUS at 12:07

## 2020-07-21 RX ADMIN — Medication 100 MG: at 12:07

## 2020-07-21 RX ADMIN — METOPROLOL TARTRATE 2 MG: 1 INJECTION, SOLUTION INTRAVENOUS at 02:07

## 2020-07-21 RX ADMIN — FENTANYL CITRATE 25 MCG: 50 INJECTION INTRAMUSCULAR; INTRAVENOUS at 03:07

## 2020-07-21 RX ADMIN — LABETALOL HYDROCHLORIDE 5 MG: 5 INJECTION, SOLUTION INTRAVENOUS at 02:07

## 2020-07-21 RX ADMIN — CEFAZOLIN SODIUM 2 G: 2 SOLUTION INTRAVENOUS at 01:07

## 2020-07-21 RX ADMIN — FENTANYL CITRATE 100 MCG: 50 INJECTION INTRAMUSCULAR; INTRAVENOUS at 12:07

## 2020-07-21 RX ADMIN — PROPOFOL 200 MG: 10 INJECTION, EMULSION INTRAVENOUS at 12:07

## 2020-07-21 RX ADMIN — FENTANYL CITRATE 50 MCG: 50 INJECTION INTRAMUSCULAR; INTRAVENOUS at 01:07

## 2020-07-21 RX ADMIN — PROPOFOL 150 MG: 10 INJECTION, EMULSION INTRAVENOUS at 03:07

## 2020-07-21 RX ADMIN — LIDOCAINE HYDROCHLORIDE 0.1 MG: 10 INJECTION, SOLUTION EPIDURAL; INFILTRATION; INTRACAUDAL; PERINEURAL at 10:07

## 2020-07-21 RX ADMIN — SODIUM CHLORIDE, SODIUM LACTATE, POTASSIUM CHLORIDE, AND CALCIUM CHLORIDE: .6; .31; .03; .02 INJECTION, SOLUTION INTRAVENOUS at 10:07

## 2020-07-21 RX ADMIN — ROCURONIUM BROMIDE 50 MG: 10 INJECTION, SOLUTION INTRAVENOUS at 12:07

## 2020-07-21 RX ADMIN — PROPOFOL 50 MG: 10 INJECTION, EMULSION INTRAVENOUS at 01:07

## 2020-07-21 RX ADMIN — ONDANSETRON 4 MG: 2 INJECTION, SOLUTION INTRAMUSCULAR; INTRAVENOUS at 03:07

## 2020-07-21 RX ADMIN — SUCCINYLCHOLINE CHLORIDE 80 MG: 20 INJECTION, SOLUTION INTRAMUSCULAR; INTRAVENOUS at 03:07

## 2020-07-21 RX ADMIN — ONDANSETRON 8 MG: 4 TABLET, ORALLY DISINTEGRATING ORAL at 06:07

## 2020-07-21 NOTE — DISCHARGE INSTRUCTIONS
INSTRUCTIONS TO FOLLOW AFTER SINUS AND NASAL SURGERY  DR. BANEGAS - [facility]    Office hours:  Weekdays 8:00 am to 5:00 pm.  Please call [phone] and ask to speak with one of his medical assistants, Caitlin or Ilya, at the [facility].    After-hours & weekends:  Please call [phone] and ask to speak with the ENT resident doctor.    Your first office visit with Dr. Banegas after surgery should have been already scheduled.  If you don't know when it is, call our office at [phone] and we can help you schedule   the appointment or notify you of the scheduled time. Normally there will be a follow-up visit each week for two to three weeks after surgery.     Please call IMMMEDIATELY if you have:   Temperature of 101° F or greater   Any unusual, painful swelling   Any active bleeding that saturates more than a 4x4 gauze   Any thick drainage green or yellow drainage   Changes in vision or swelling around the eye   Pain not relieved by your prescribed pain medication    ACTIVITY:  Sleep on your back with the head of the bead elevated, up on 2-3 pillows, or in a recliner for the first 3 to 5 days. This will help with swelling.     After surgery you may have a lot of nasal drainage. This is normal. You may breathe through your nose after surgery, though may notice you feel congested because of swelling and absorbable sponges placed in the nose. Nasal breathing usually improves significantly after your first visit after surgery when this material is removed.     You may wake up after surgery with thick white stockings on. Wear them until you are walking around more. It is important to walk around often while at home to keep your blood circulating and prevent blood clots.    If you use CPAP or BiPAP to sleep at night, you should wait at least 48 hours before resuming use.  Dr. Banegas will advise you when it is safe to do this.  You may shower after surgery.    RESTRICTED ACTIVITIES AFTER  SURGERY:   Do NOT blow your nose for 2 weeks. If you have to sneeze or cough, do so with your mouth open. Allow drainage from nose to fall on a mustache dressing (gauze placed under nose) and change it as needed, or gently wipe outside of nose without blowing.   AVOID all heavy lifting, straining or bending for 2 weeks.    AVOID any sexual activity for 2 weeks after surgery.   AVOID semi-contact sports or vigorous exercising for 3-4 weeks. Dr. Banegas will let you know when you are cleared to resume exercise.   AVOID flying or swimming for 2 weeks.     Do NOT operate a motor vehicle or any type of heavy machinery within 24 hours of taking pain medication.   DO NOT smoke or be around smokers.   AVOID irritating substances that might make you sneeze, such as dust, chalk, harsh chemicals, and allergic triggers.  This might also include spicy foods.    DRESSINGS:  Change the gauze mustache dressing under your nose as needed. (If unsure what this dressing is or how to do this, ask your doctor or nurse before you leave the hospital.) You may have pinkish-red drainage for 2-3 days, small amounts of bleeding may occur until the nose heals; you should notify us if there is significant bleeding and if there is ongoing bleeding which is not quickly stopping.    A dissolvable sponge will often be placed into the nose/sinuses during surgery. These absorbable dressings are easily and gently removed at your first visit after surgery (often about one week after). These must be kept moist with nasal saline irrigations to allow them to help the nose heal and prevent them from becoming hard and less easily removed - another reason for the importance of your nasal irrigations.     MEDICATIONS:  After surgery, you will be sent home with prescriptions for:    [x  ] Pain medication (Oxycodone) - take as needed for more severe pain, but Tylenol and ibuprofen is usually sufficient to control most of the pain related to surgery    [x   ] Anti-nausea medication (Zofran) - for use as needed after surgery    [x  ] Antibiotics (Augmentin) - take all of this medication as instructed until completed.     [x  ] Steroid (Medrol) - take according to specific instructions until completed.      Most people need pain medication for the first few days after surgery, although a narcotic is rarely necessary.  The best pain control comes from a combination of ACETAMINOPHEN (Tylenol) and IBUPROFEN (Motrin).  You will be given prescriptions for these so you have the recommended dose and usage instructions, but can use any over-the-counter versions of acetaminophen and ibuprofen.  These can be alternated so that you are taking something about every 3 hours while awake.     Some people have problems with bowel movements after surgery. If you have NOT had a bowel movement 3-5 days after surgery, go to your local pharmacy and purchase an over the counter stool softener such as COLACE. You can also ask the pharmacist for his or her recommendation. If you still do not have a bowel movement after starting the softener, please call the office. Antibiotics can also upset your stomach and cause nausea, diarrhea, and/or constipation by reducing the good bacteria in your gut. Replenish the good bacteria by consuming PROBIOTICS either as supplement pills, eating probiotic yogurt, or drinking kefir yogurt drink. Look for terms like live and active cultures or live probiotics on the product - a common brand of probiotic yogurt is [employer]'s.    You have been given an ointment called MUPIROCIN to use after surgery. Use a cotton swab to apply gently inside the nostrils.  Do this 2 times a day for 1 week.    You will need these over-the-counter medications after surgery:    SALINE NASAL SPRAY (any brand, such as ocean saline spray or simply saline): Begin using nasal saline spray at discharge, 3 sprays on each side every 3 hours while awake to keep the nose moist and help the  packing material stay moist and gently dissolve. This also helps reduce crusting and material from accumulating while not blowing your nose. Gently wipe away any drainage and discharge from the front of the nose. Keeping the packing moist will also help remove this at your first visit. Examples of nasal saline spray:          SALINE SINUS RINSE ([employer] brand): You will use this saline sinus irrigation to rinse out your nose and sinuses after surgery.  Begin doing this the day after surgery, unless instructed otherwise by Dr. Banegas. Start with gentle slow irrigations the first week after surgery mainly to gently wash away debris in the nose and keep it moist. You should do a full bottle, half on one side of your nose and half on the other, 2 times per day (you cannot do this too much). This helps the nose to heal well, remove debris, and reduce risk of unwanted scar tissue from forming. After your splints are removed from the nose and you are seen for post-op you may benefit from more thorough irrigations with more force to wash away material from the nose, but Dr. Banegas will evaluate first and make sure everything looks good. Follow the instructions on the box: mix the salt packet with clean water (bottle, previously boiled, distilled, etc -- not tap water) to the line on the bottle to make the irrigation.    AFRIN (regular strength): Only use if you have nasal congestion or bleeding. Use 2 times per day for 3 days, stop for 1 day, continue 2 times per day for 3 days, then stop completely. This is very successful at resolving minor nose bleeds following surgery. The generic drug name for Afrin is oxymetazoline, and it is sold as a nasal decongestant.  NOTE:  You may not need to do this at all.    DIET:   Avoid hot and spicy foods for 1 week after surgery.   Begin with bland foods the evening after surgery and advance to your regular diet as tolerated.  It is not necessary to take only soft food unless you are  recovering from tonsil surgery.   Drink plenty of fluids (water is best).    Avoid alcoholic and caffeinated beverages for 1 week after surgery because they can cause you to become dehydrated.      Following these instructions, remembering to do the sinus irrigations, and returning for your follow-up visits after surgery will make sure you have the best possible result. Wishing you a quick and easy recovery from surgery, and looking forward to your improvements.     Amanuel Banegas MD  Rhinology, Sinus, and Skull Base Surgery  Department of Otorhinolaryngology        Fall Prevention  Millions of people fall every year and injure themselves. You may have had anesthesia or sedation which may increase your risk of falling. You may have health issues that put you at an increased risk of falling.     Here are ways to reduce your risk of falling.  ·   · Make your home safe by keeping walkways clear of objects you may trip over.  · Use non-slip pads under rugs. Do not use area rugs or small throw rugs.  · Use non-slip mats in bathtubs and showers.  · Install handrails and lights on staircases.  · Do not walk in poorly lit areas.  · Do not stand on chairs or wobbly ladders.  · Use caution when reaching overhead or looking upward. This position can cause a loss of balance.  · Be sure your shoes fit properly, have non-slip bottoms and are in good condition.   · Wear shoes both inside and out. Avoid going barefoot or wearing slippers.  · Be cautious when going up and down stairs, curbs, and when walking on uneven sidewalks.  · If your balance is poor, consider using a cane or walker.  · If your fall was related to alcohol use, stop or limit alcohol intake.   · If your fall was related to use of sleeping medicines, talk to your doctor about this. You may need to reduce your dosage at bedtime if you awaken during the night to go to the bathroom.    · To reduce the need for nighttime bathroom trips:  ¨ Avoid drinking fluids for  several hours before going to bed  ¨ Empty your bladder before going to bed  ¨ Men can keep a urinal at the bedside  · Stay as active as you can. Balance, flexibility, strength, and endurance all come from exercise. They all play a role in preventing falls. Ask your healthcare provider which types of activity are right for you.  · Get your vision checked on a regular basis.  · If you have pets, know where they are before you stand up or walk so you don't trip over them.  Use night lights.

## 2020-07-21 NOTE — PLAN OF CARE
Pt is s/p septoplasty. He easily arouses to voice and his vitals are WNL. No dressing present d/t discomfort. Dr. Banegas is aware. No additional drainage noted. Ice pack provided per patient request. Pain controlled and he denies nausea at this time. Recovery care complete. Pt transferred to [facility].

## 2020-07-21 NOTE — ANESTHESIA PREPROCEDURE EVALUATION
07/21/2020  Cortez Napoles is a 45 y.o., male.  Pre-operative evaluation for Procedure(s) (LRB):  SEPTOPLASTY, NOSE, ENDOSCOPIC (N/A)  EXCISION, NASAL TURBINATE, SUBMUCOSAL (Bilateral)  ENDOSCOPY, NOSE (Bilateral)    NPO >8h  METS >4    Vitals:    07/20/20 0934   Weight: 112.9 kg (248 lb 14.4 oz)       Patient Active Problem List   Diagnosis    Rhinitis       Review of patient's allergies indicates:  No Known Allergies    No current facility-administered medications on file prior to encounter.      Current Outpatient Medications on File Prior to Encounter   Medication Sig Dispense Refill    diphenhydrAMINE (BENADRYL) 25 mg capsule Take 25 mg by mouth daily as needed.      esomeprazole (NEXIUM) 20 MG capsule Take 1 capsule (20 mg total) by mouth before breakfast. 30 capsule 3    fluticasone propionate (FLONASE) 50 mcg/actuation nasal spray          Past Surgical History:   Procedure Laterality Date    FRACTURE SURGERY Left     ankle, with ligament repair also    KNEE ARTHROSCOPY W/ MENISCAL REPAIR         Social History     Socioeconomic History    Marital status: Single     Spouse name: Not on file    Number of children: Not on file    Years of education: Not on file    Highest education level: Not on file   Occupational History    Not on file   Social Needs    Financial resource strain: Not on file    Food insecurity     Worry: Not on file     Inability: Not on file    Transportation needs     Medical: Not on file     Non-medical: Not on file   Tobacco Use    Smoking status: Never Smoker    Smokeless tobacco: Never Used   Substance and Sexual Activity    Alcohol use: Yes     Comment: rarely    Drug use: Never    Sexual activity: Yes     Partners: Female   Lifestyle    Physical activity     Days per week: Not on file     Minutes per session: Not on file    Stress: Not on file    Relationships    Social connections     Talks on phone: Not on file     Gets together: Not on file     Attends Tenriism service: Not on file     Active member of club or organization: Not on file     Attends meetings of clubs or organizations: Not on file     Relationship status: Not on file   Other Topics Concern    Not on file   Social History Narrative    Not on file       Lab Results   Component Value Date    WBC 6.65 07/15/2020    HGB 15.9 07/15/2020    HCT 46.0 07/15/2020    MCV 90 07/15/2020     07/15/2020       BMP  Lab Results   Component Value Date     2020    K 4.3 2020     2020    CO2 30 (H) 2020    BUN 11 2020    CREATININE 1.3 2020    CALCIUM 9.1 2020    ANIONGAP 6 (L) 2020    ESTGFRAFRICA >60.0 2020    EGFRNONAA >60.0 2020           Diagnostic Studies:      EKD Echo:  No results found for this or any previous visit.      Anesthesia Evaluation    I have reviewed the Patient Summary Reports.   I have reviewed the NPO Status.   I have reviewed the Medications.     Review of Systems  Anesthesia Hx:  No problems with previous Anesthesia  History of prior surgery of interest to airway management or planning:  Denies Personal Hx of Anesthesia complications.   Social:  Non-Smoker, Social Alcohol Use    Hematology/Oncology:  Hematology Normal   Oncology Normal     EENT/Dental:   chronic allergic rhinitis   Cardiovascular:  Cardiovascular Normal Exercise tolerance: good     Pulmonary:  Pulmonary Normal    Renal/:  Renal/ Normal     Hepatic/GI:   GERD    Neurological:  Neurology Normal    Endocrine:  Endocrine Normal    Psych:  Psychiatric Normal           Physical Exam  General:  Obesity    Airway/Jaw/Neck:  Airway Findings: Mouth Opening: Normal General Airway Assessment: Adult  Mallampati: II  TM Distance: 4 - 6 cm        Eyes/Ears/Nose:  EYES/EARS/NOSE FINDINGS: Normal   Dental:  Dental Findings: In tact         Mental Status:  Mental Status Findings: Normal        Anesthesia Plan  Type of Anesthesia, risks & benefits discussed:  Anesthesia Type:  general  Patient's Preference:   Intra-op Monitoring Plan: standard ASA monitors  Intra-op Monitoring Plan Comments:   Post Op Pain Control Plan: multimodal analgesia  Post Op Pain Control Plan Comments:   Induction:   IV  Beta Blocker:  Patient is not currently on a Beta-Blocker (No further documentation required).       Informed Consent: Patient understands risks and agrees with Anesthesia plan.  Questions answered. Anesthesia consent signed with patient.  ASA Score: 2     Day of Surgery Review of History & Physical:  There are no significant changes.          Ready For Surgery From Anesthesia Perspective.

## 2020-07-21 NOTE — INTERVAL H&P NOTE
The patient has been examined and the H&P has been reviewed:    I concur with the findings and no changes have occurred since H&P was written.    Anesthesia/Surgery risks, benefits and alternative options discussed and understood by patient/family.          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

## 2020-07-21 NOTE — TRANSFER OF CARE
Anesthesia Transfer of Care Note    Patient: Cortez Napoles    Procedure(s) Performed: Procedure(s) (LRB):  SEPTOPLASTY, NOSE, ENDOSCOPIC (N/A)  EXCISION, NASAL TURBINATE, SUBMUCOSAL (Bilateral)  ENDOSCOPY, NOSE (Bilateral)    Patient location: PACU    Anesthesia Type: general    Transport from OR: Transported from OR on room air with adequate spontaneous ventilation    Post pain: adequate analgesia    Post assessment: no apparent anesthetic complications and tolerated procedure well    Post vital signs: stable    Level of consciousness: awake, alert and oriented    Nausea/Vomiting: no nausea/vomiting    Complications: none    Transfer of care protocol was followed      Last vitals:   Visit Vitals  /80 (BP Location: Right arm, Patient Position: Lying)   Pulse 94   Temp 37 °C (98.6 °F) (Axillary)   Resp 20   Wt 112.9 kg (248 lb 14.4 oz)   SpO2 100%   BMI 32.84 kg/m²

## 2020-07-21 NOTE — PROCEDURES
Nasal/sinus endoscopy    Date/Time: 7/6/2020 1:30 PM  Performed by: Aamnuel Banegas MD  Authorized by: Amanuel Banegas MD     Consent Done?:  Yes (Verbal)  Anesthesia:     Local anesthetic:  4% Xylocaine spray with Romain-Synephrine    Patient tolerance:  Patient tolerated the procedure well with no immediate complications  Nose:     Procedure Performed:  Nasal Endoscopy  External:      No external nasal deformity  Intranasal:      Mucosa no polyps     Mucosa ulcers not present     No mucosa lesions present     Enlarged turbinates     Septum gross deformity  Nasopharynx:      No mucosa lesions     Adenoids not present     Posterior choanae patent     Eustachian tube patent       Nasal endoscopy was performed as indicated to evaluate the patient's chief concerns and for further evaluation of findings not able to be fully assessed by physical exam with anterior rhinoscopy. The endoscope was utilized to evaluate the bilateral sinonasal cavities, mucosa, sinus ostia and turbinates. Verbal consent was obtained after describing the indication for this diagnostic procedure and potential risks. Topical anesthetic and decongestant was applied in the bilateral nares. The scope was placed in the patient's left anterior nares and advanced through the nasal cavity carefully examining structures including the nasal septum, nasal turbinates, osteomeatal complex, olfactory recess, sinus ostia, as well as the nasopharynx and eustachian tube orifice at the torus tubarius. The scope was then placed in the patient's right anterior nares and an identical diagnostic evaluation performed on this contralateral side. The patient tolerated the procedure well. The procedure was performed successfully, completely, and without complications.     Positive and negative findings are indicated for key anatomic structures and clinical questions addressed by this procedure. Findings were explained to the patient with all questions answered.  Descriptions and representative images of key findings from this diagnostic endoscopy procedure can be found in the associated clinic note of the same date of service. All uploaded images obtained during this exam may also be found in the media section of the patient's chart.      Summary of key findings:   + septal deviation present  + inferior turbinate hypertrophy present  - no purulent or abnormal sinonasal discharge  - no nasal polyps or masses present  - no septal perforation  - nasopharynx symmetric and torus without eustachian tube obstruction  - no obstructive adenoid tissue present  - no active epistaxis  No additional concerning findings on nasal endoscopy      Amanuel Banegas MD    Rhinology, Allergy, and Sinus-Skull Base Surgery    Department of Otorhinolaryngology    [facility]    Phone  [phone]    Fax      [phone]

## 2020-07-22 ENCOUNTER — NURSE TRIAGE (OUTPATIENT)
Dept: ADMINISTRATIVE | Facility: CLINIC | Age: 45
End: 2020-07-22

## 2020-07-22 NOTE — DISCHARGE SUMMARY
Outpatient surgery discharge summary:    Admit Date (Surgery Date): 7/21/2020    Discharge Date: 7/21/2020    Hospital Course: Surgery performed as planned without complications. Procedures tolerated well and sent to recovery in stable condition with plan to discharge to home.     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 If you have to sneeze or cough, do so with your mouth open. Allow drainage from nose to fall on a mustache dressing (gauze placed under nose) and change it as needed, or gently wipe outside of nose without blowing.  AVOID all heavy lifting, straining or bending for 2 weeks.   AVOID any sexual activity for 2 weeks after surgery.  AVOID semi-contact sports or vigorous exercising for 3-4 weeks. Dr. Banegas will let you know when you are cleared to resume exercise.  AVOID flying or swimming for 2 weeks.    Do NOT operate a motor vehicle or any type of heavy machinery within 24 hours of taking pain medication.  DO NOT smoke or be around smokers.  AVOID irritating substances that might make you sneeze, such as dust, chalk, harsh chemicals, and allergic triggers.  This might also include spicy foods.     Wound care routine (specify)   Order Comments: Wound care routine: See discharge instructions for sinus and nasal surgery

## 2020-07-22 NOTE — OP NOTE
Patient: Cortez Napoles  MRN: [id number]    DOS: 7/21/2020    Attending Surgeon: Amanuel Banegas MD    OPERATIVE REPORT    Preoperative Diagnoses:   1. Nasal airway obstruction  2. Septal deviation  3. Bilateral inferior turbinate hypertrophy  4. Bilateral nasal septal body hypertrophy    Postoperative Diagnoses:   1. Nasal airway obstruction  2. Septal deviation  3. Bilateral inferior turbinate hypertrophy  4. Bilateral nasal septal body hypertrophy    Procedures Performed:   1. Diagnostic nasal endoscopy  2. Septoplasty  3. Intranasal ablation of right septal swell body  4. Intranasal ablation of left septal swell body  5. Bilateral partial resection of the inferior turbinates with therapeutic outfracture    Indications: Cortez Napoles is a 45 y.o. male with a history of significant nasal congestion and partial nasal obstruction. Diagnostic nasal endoscopy was indicated to evaluate these chronic sinonasal symptoms which have not improved with medical management. Internal nasal valve compromise was identified with contributions from inferior turbinate hypertrophy, nasal septum deviation, and septal swell body hypertrophy. Planned procedures were indicated to address these sources of nasal airway obstruction and improve nasal airflow bilaterally.    Findings: Mr. Napoles had evidence by bilateral diagnostic nasal endoscopy of bilateral inferior turbinate hypertrophy, septal deviation and spurring, bilateral septal swell body hypertrophy, and nasal airway obstruction from internal and external nasal valve narrowing. There was specifically biphasic deflection of the nasal septum with septal spur to the left contacting the inferior turbinate. There was no evidence of polyps or purulence from the visualized middle meatus or sphenoethmoidal recess. The nasal cavity was thoroughly evaluated with findings of turbinate hypertrophy and septal deviation but no masses, polyps, or purulence. There was significant  hypervascularity of the mucosa, a large dilated vessel in the left anterior nares, and vascular engorgement of erectile tissues of the septal body and nasal turbinates. All of these sources of increased vascularity produced significantly greater bleeding throughout the procedures, potentially worsened by periods of intraoperative hypertension, but were successfully controlled with a combination of methods including limited focal cautery and application of hemostatic agents and dressings. The nasopharynx was examined and symmetric, with no masses or eustachian tube obstruction. At the conclusion of the procedures performed there was significant improvement in bilateral nasal airway patency with greatly reduced obstruction from all sources addressed.    Specimens: None    Estimated Blood Loss: Approximately 100 mL (less than 200 mL)    Complications: None apparent    Description of Procedure:   Patient was identified in the holding area and taken to the operating room, where he was placed in the supine position, and induced under general anesthesia per Anesthesiology. A formal surgical pause was held, and all aspects were addressed. The nose was topically decongested with 1:1,000 epinephrine (dyed with fluorescein) on neuropledgets. Patient was draped in the normal fashion. A 0-degree endoscope was used to perform diagnostic nasal endoscopy through several passes of the nares bilaterally examining the nasal cavity, inferior and middle turbinates, middle meatus, sphenoethmoidal recess and superior turbinate, as well as the nasopharynx (including the posterior naspharyngeal wall, posterior choana, and fossa of Rosenmueller/eustachian tube orifice). This demonstrated the findings listed above and informed the subsequent surgical procedures performed.    Injections of local anesthetic using local anesthetic with 1:100,000 epinephrine were performed at the anterior nasal septum and in the nasal cavity bilaterally.  Septoplasty was first addressed. An incision was created in the left anterior septum and a left mucoperichondrial flap was elevated. The bony-cartilaginous junction was disarticulated and offending portions of cartilage and bone were resected taking great care to spare dorsal and caudal struts. Leftward septal spur was resected. The hypertrophied bilateral septal swell bodies, with abnormal submucosal tissue overlying the nasal septum narrowing of the internal nasal valves bilaterally, were then each addressed. The left septal swell body was ablated using the turbinate microdebrider to partially remove tissue medially, followed by low temperature cautery ablation of tissue at the septal swell body using electrocoagulation device and preserving the overlying septal mucosal tissue. This same method was used to ablate the abnormal submucosal tissue at the right septal swell body in an identical fashion. The intended ablation of abnormal tissue was achieved bilaterally, with minimal impact to the mucosa, no significant bleeding, and no apparent complications. Excellent improvement in the airway was noted bilaterally. The septum was quilt sutured using 4-0 plain gut suture, the area of the septal body tissue of the septum was narrowed by quilting in a similar fashion, and the anterior incision was closed with 4-0 chromic gut sutures in an interrupted fashion. The middle turbinates demonstrated significant hypertrophy and so were medialized and sutured through the nasal septum with 4-0 plain cut to reduce their contribution to nasal airway narrowing bilaterally and reduce chances of lateralization and osteomeatal complex obstruction.    Next, the inferior turbinates were addressed. An incision was made in the head of the inferior turbinates bilaterally after infiltrating local anesthetic with 1:100,000 epinephrine in the submucosal plane. A caudal elevator was used to elevate the submucosa from underlying conchal bone  bilaterally. The inferior turbinate microdebrider blade was placed through the anterior mucosal incision, within the submucosal tissue of the inferior turbinates, to perform submucous resection of the inferior turbinates and portions of the underlying conchal bones bilaterally. The turbinates were then therapeutically outfractured to lateralize the position of the inferior turbinate bilaterally.    The sinonasal cavities were copiously irrigated with normal saline and all debris was removed. There was considerable oozing from multiple sources and nearly all operated tissue bilaterally which appeared related to hypervascularity of the tissue, dilated vessels of the nasal mucosa, and engorged turbinate tissue, which diffusely increased with elevated blood pressure during the procedures. Throughout the procedures, 1:1,000 epinephrine on neuropledgets was used topically as needed to reduce bleeding. All sources of bleeding were carefully addressed, using electrocautery sparingly at focal sites, as well as multiple hemostatic agents including Surgiflo thrombin and hemostatic matrix, Surgicel fibrillar, and absorbable chittin dressings over areas of oozing mucosa and mucosal edges. Small pieces of fibrillar were placed at the heads of the inferior turbinates. Diagnostic nasal endoscopy was repeated through several passes of the nares bilaterally, demonstrating much improved nasal airway with greatly improved septal deviation and all anatomic sources of obstruction which were identified and addressed. Again, there were no masses, polyps, purulence, or other concerning findings noted and the nasopharynx was clear. Intranasal silicone septal splints (Baxter Open Lumen Splint) were placed bilaterally after partially removing the airway channels, and secured anteriorly with a prolene suture tied within the left nares. Mupirocin ointment was applied on the nasal splints and within the bilateral nares. The patient was then  returned to the care of Anesthesia for awakening and transfer to post-operative recovery. He was extubated successfully and had no further active bleeding. He was recovering well from anesthesia and these procedures in post-operative recovery when I subsequently re-evaluated him prior to discharge.    Disposition:  Plan is for discharge to home, as all procedures were tolerated well with no complications.    Postoperative Care:  Postoperative care instructions have been discussed with the patient and provided in written form. Prescriptions for postoperative medications were provided along with instructions for use. Postoperative endoscopic debridement is planned at one week and again at approximately three weeks following date of surgery. This is necessary to remove crusting/necrotic tissue, blood clots and retained secretions that may lead to adverse scarring, infection, and prolonged healing. Failure to perform adequate postoperative debridement following endoscopic sinus surgery is known to result in suboptimal healing, scarring and poor surgical outcomes.      Amanuel Banegas MD    Rhinology, Allergy, and Sinus-Skull Base Surgery    Department of Otorhinolaryngology    [facility]    Phone  [phone]    Fax      [phone]

## 2020-07-22 NOTE — BRIEF OP NOTE
Brief Operative Note     SUMMARY     Surgery Date: 7/21/2020     Surgeon: Amanuel Banegas MD    Surgeon(s) and Role:     * Amanuel Banegas MD - Primary    Pre-op Diagnosis:  Hypertrophy of nasal turbinates [J34.3]  Refractory obstruction of nasal airway [J34.89]  Nasal septal deviation [J34.2]  Nasal valve collapse [J34.89]    Post-op Diagnosis:  Hypertrophy of nasal turbinates [J34.3]  Refractory obstruction of nasal airway [J34.89]  Nasal septal deviation [J34.2]  Nasal valve collapse [J34.89]    Procedures Performed:   Procedure(s) (LRB):  SEPTOPLASTY, NOSE, ENDOSCOPIC (N/A)  EXCISION, NASAL TURBINATE, SUBMUCOSAL (Bilateral)  ENDOSCOPY, NOSE (Bilateral)    Anesthesia: General    Findings/Hodge Components:  Mr. Napoles had evidence by bilateral diagnostic nasal endoscopy of bilateral inferior turbinate hypertrophy, septal deviation and spurring, bilateral septal swell body hypertrophy, and nasal airway obstruction from internal and external nasal valve narrowing. There was specifically biphasic deflection of the nasal septum with septal spur to the left contacting the inferior turbinate. There was no evidence of polyps or purulence from the visualized middle meatus or sphenoethmoidal recess. The nasal cavity was thoroughly evaluated with findings of turbinate hypertrophy and septal deviation but no masses, polyps, or purulence. There was significant hypervascularity of the mucosa, a large dilated vessel in the left anterior nares, and vascular engorgement of erectile tissues of the septal body and nasal turbinates. All of these sources of increased vascularity produced significantly greater bleeding throughout the procedures, potentially worsened by periods of intraoperative hypertension, but were successfully controlled with a combination of methods including limited focal cautery and application of hemostatic agents and dressings. The nasopharynx was examined and symmetric, with no masses or eustachian tube  obstruction. At the conclusion of the procedures performed there was significant improvement in bilateral nasal airway patency with greatly reduced obstruction from all sources addressed.      Estimated Blood Loss: 100cc         Specimens (From admission, onward)    None          Disposition:   The patient did well with surgery and there were no complications. he was extubated and taken to PACU in anticipation of discharge to home for this planned outpatient same day procedure. Post-operative orders were placed and prescriptions provided for post-operative medications.    Additional Information and Post-op Instructions:   For additional information and answers to common questions regarding the procedures performed, please see the patient instructions I have included in the discharge materials for today's surgery.    Thank You  MD Amanuel Tracy MD    Rhinology, Allergy, and Sinus-Skull Base Surgery    Department of Otorhinolaryngology    [facility]    Phone  [phone]    Fax      [phone]

## 2020-07-22 NOTE — TELEPHONE ENCOUNTER
Called Patient and informed him that he will have swelling due to having Surgery, and to drink plenty of fluids to keep his mouth moist.  Patient has full undertstanding.

## 2020-07-22 NOTE — ANESTHESIA POSTPROCEDURE EVALUATION
Anesthesia Post Evaluation    Patient: Cortez Napoles    Procedure(s) Performed: Procedure(s) (LRB):  SEPTOPLASTY, NOSE, ENDOSCOPIC (N/A)  EXCISION, NASAL TURBINATE, SUBMUCOSAL (Bilateral)  ENDOSCOPY, NOSE (Bilateral)    Final Anesthesia Type: general    Patient location during evaluation: PACU  Patient participation: Yes- Able to Participate  Level of consciousness: awake and alert  Post-procedure vital signs: reviewed and stable  Pain management: adequate  Airway patency: patent    PONV status at discharge: No PONV  Anesthetic complications: no      Cardiovascular status: blood pressure returned to baseline and hemodynamically stable  Respiratory status: unassisted and spontaneous ventilation  Hydration status: euvolemic  Follow-up not needed.          Vitals Value Taken Time   /80 07/21/20 1900   Temp 36.7 °C (98 °F) 07/21/20 1900   Pulse 84 07/21/20 1900   Resp 18 07/21/20 1900   SpO2 99 % 07/21/20 1900         Event Time   Out of Recovery 17:41:00         Pain/Cornel Score: Pain Rating Prior to Med Admin: 6 (7/21/2020  5:19 PM)  Cornel Score: 9 (7/21/2020  5:44 PM)  Modified Cornel Score: 18 (7/21/2020  7:00 PM)

## 2020-07-22 NOTE — TELEPHONE ENCOUNTER
Had a septoplasty on yesterday and having trouble breathing    Reason for Disposition   Difficulty breathing, severe   Sounds like a life-threatening emergency to the triager    Additional Information   Negative: Slow, shallow and weak breathing   Negative: Stridor   Negative: Wheezing started suddenly after medicine, an allergic food or bee sting   Negative: Passed out (i.e., lost consciousness, collapsed and was not responding)   Negative: Difficult to awaken or acting confused (e.g., disoriented, slurred speech)   Negative: Bluish (or gray) lips or face now   Negative: Severe difficulty breathing (e.g., struggling for each breath, speaks in single words)   Negative: Choking on something   Negative: [1] Breathing stopped AND [2] hasn't returned    Protocols used: RESPIRATORY MULTIPLE SYMPTOMS - GUIDELINE [id number], BREATHING DIFFICULTY-A-

## 2020-07-22 NOTE — PLAN OF CARE
Pt verbalized readiness to go home and understanding of discharge instructions.supplies for additional mustache drsg provided.

## 2020-07-23 ENCOUNTER — HOSPITAL ENCOUNTER (EMERGENCY)
Facility: HOSPITAL | Age: 45
Discharge: HOME | End: 2020-07-23
Attending: EMERGENCY MEDICINE
Payer: COMMERCIAL

## 2020-07-23 ENCOUNTER — TELEPHONE (OUTPATIENT)
Dept: OTOLARYNGOLOGY | Facility: CLINIC | Age: 45
End: 2020-07-23

## 2020-07-23 VITALS
HEART RATE: 64 BPM | TEMPERATURE: 98 F | WEIGHT: 245 LBS | HEIGHT: 73 IN | RESPIRATION RATE: 18 BRPM | SYSTOLIC BLOOD PRESSURE: 134 MMHG | OXYGEN SATURATION: 99 % | DIASTOLIC BLOOD PRESSURE: 86 MMHG | BODY MASS INDEX: 32.47 KG/M2

## 2020-07-23 DIAGNOSIS — G89.18 POST-OP PAIN: Primary | ICD-10-CM

## 2020-07-23 PROCEDURE — 99281 EMR DPT VST MAYX REQ PHY/QHP: CPT

## 2020-07-23 NOTE — TELEPHONE ENCOUNTER
Pt had procedure on nasal cavity. Pt has packing in one side. Pt reports no longer bleeding just regular drainage. Wondering if he can have packing removed. Pt reports he is having trouble sleeping- reports pain controlled with acetaminophen and ibuprofen.    Advised patient per discharge instructions the packing needs to stay in for a week and kept moist.  Paged ENT resident on call for more direction  Resident on call advised the packing does need to stay in and removing packing could cause bleeding. Advised to try sleeping sitting up and advised resident reports it is very uncomfortable.  Informed patient to please call the office in the am for more direction     Reason for Disposition   Other post-op symptom or question    Protocols used: POST-OP SYMPTOMS AND [id number]

## 2020-07-23 NOTE — ED TRIAGE NOTES
"Pt reports he had right sided sinus surgery yesterday and is c/o "packing moved", epistaxis and inability to breathe through nose. Pt reports he woke up choking and has been having a hard time breathing. Pt reports he feels like he is suffocating. Bleeding is controlled-occassional bloody dribbles.   "

## 2020-07-23 NOTE — TELEPHONE ENCOUNTER
Called Patient and notified him that Dr. Banegas wants to see him today at 1:30 PM.  Patient said no he is fine and will call if need be.

## 2020-07-23 NOTE — ED PROVIDER NOTES
"Encounter Date: 7/23/2020    SCRIBE #1 NOTE: I, Billy Trinidad, am scribing for, and in the presence of,  Elliot Phelan MD. I have scribed the following portions of the note - Other sections scribed: HPI, ROS, PE.       History     Chief Complaint   Patient presents with    Post-op Problem     Pt reports he had right sided sinus surgery yesterday and is c/o "packing moved", epistaxis and inability to breathe through nose.     Cortez Napoles is an 45 y.o. male presenting to the ED complaining of post operation problems of a endoscopic nasal septoplasty underwent two days ago performed by Dr. Banegas. Patient reports that the packing has loosened on the right side and moved following sleep, rendering him unable to inhale or exhale normal. Patient reports symptoms of gagging, sinus pain, sinus pressure, mild epistaxis, and  post-nasal drip. Patient denies fever or cough. Patient is compliant with antibiotic medication.  He has used Afrin, Flonase, and saline rinses without improvement.  He is only taking Tylenol and ibuprofen for pain for fear becoming dependent on oxycodone.  He has been sleeping with his head elevated.  Patient reports a past surgical history of nasal septoplasty. No known drug allergies. Patient drinks alcohol rarely. No tobacco or street drug abuse.      The history is provided by the patient.     Review of patient's allergies indicates:  No Known Allergies  History reviewed. No pertinent past medical history.  Past Surgical History:   Procedure Laterality Date    ENDOSCOPIC NASAL SEPTOPLASTY N/A 7/21/2020    Procedure: SEPTOPLASTY, NOSE, ENDOSCOPIC;  Surgeon: Amanuel Banegas MD;  Location: [facility];  Service: ENT;  Laterality: N/A;  NO DISC AVAILABLE  RN Pre Op 7-, ---COVID NEGATIVE  ON  7-20-20 [place]    FRACTURE SURGERY Left     ankle, with ligament repair also    KNEE ARTHROSCOPY W/ MENISCAL REPAIR      NASAL ENDOSCOPY Bilateral 7/21/2020    Procedure: ENDOSCOPY, NOSE;  Surgeon: Amanuel" ARIEL Banegas MD;  Location: [facility] OR;  Service: ENT;  Laterality: Bilateral;     Family History   Problem Relation Age of Onset    Colon cancer Maternal Grandfather      Social History     Tobacco Use    Smoking status: Never Smoker    Smokeless tobacco: Never Used   Substance Use Topics    Alcohol use: Yes     Comment: rarely    Drug use: Never     Review of Systems   Constitutional: Negative for chills, diaphoresis, fatigue and fever.   HENT: Positive for nosebleeds, postnasal drip, sinus pressure, sinus pain and trouble swallowing. Negative for congestion and sore throat.    Respiratory: Positive for shortness of breath. Negative for cough, wheezing and stridor.    Cardiovascular: Negative for chest pain.   Gastrointestinal: Negative for nausea and vomiting.   Musculoskeletal: Negative for neck pain.   Skin: Negative for wound.   Neurological: Negative for facial asymmetry and headaches.   Psychiatric/Behavioral: Positive for sleep disturbance.       Physical Exam     Initial Vitals [07/23/20 0041]   BP Pulse Resp Temp SpO2   (!) 138/90 67 18 98 °F (36.7 °C) 97 %      MAP       --         Physical Exam    Nursing note and vitals reviewed.  Constitutional: He appears well-developed and well-nourished. He is not diaphoretic. No distress.   Patient is uncomfortable.     HENT:   Head: Normocephalic and atraumatic.   Right Ear: External ear normal.   Left Ear: External ear normal.   Nose: Sinus tenderness present. Epistaxis is observed. Right sinus exhibits maxillary sinus tenderness.   Mouth/Throat: Oropharynx is clear and moist.   Packing is noted in both nostrils. Trace bleeding to right side. Tenderness to right maxillary sinus. Swelling of the turbinates bilaterally.  Mild clear nasal discharge bilaterally.  No significant periorbital swelling or facial swelling.  Normal voice.   Eyes: Conjunctivae and EOM are normal. Pupils are equal, round, and reactive to light. Right eye exhibits no discharge. Left eye  exhibits no discharge. No scleral icterus.   Neck: Neck supple. No tracheal deviation present.   Cardiovascular: Normal rate, regular rhythm and normal heart sounds.   No murmur heard.  Pulmonary/Chest: Breath sounds normal. No stridor. No respiratory distress. He has no wheezes. He has no rhonchi. He has no rales.   Musculoskeletal: Normal range of motion.   Neurological: He is alert and oriented to person, place, and time.   Skin: Skin is warm and dry. No rash noted.   No diaphoresis.   Psychiatric: He has a normal mood and affect. His behavior is normal. Judgment and thought content normal.         ED Course   Procedures  Labs Reviewed - No data to display       Imaging Results    None          Medical Decision Making:   Initial Assessment:   Patient presents with postoperative discomfort after septoplasty.  No significant bleeding.  Mild tenderness over right maxillary sinus on exam.  Packing present in bilateral nares.  Differential Diagnosis:   Postop pain, packing displacement  ED Management:  0300:  Discussed with Dr. Banegas.  Recommends no action emergency room.  Recommends continued postoperative care.  Will see patient tomorrow morning in office for re-evaluation.            Scribe Attestation:   Scribe #1: I performed the above scribed service and the documentation accurately describes the services I performed. I attest to the accuracy of the note.                          Clinical Impression:     1. Post-op pain            Disposition:   Disposition: Discharged  Condition: Stable     ED Disposition Condition    Discharge Stable        ED Prescriptions     None        Follow-up Information     Follow up With Specialties Details Why Contact Info    Amanuel Banegas MD Otolaryngology Call today  [address]  [address]  [facility] [address]  [phone]                              I, Elliot Phelan MD, personally performed the services described in this documentation. All medical record entries made by  the scribe were at my direction and in my presence. I have reviewed the chart and agree that the record reflects my personal performance and is accurate and complete.           Elliot Phelan MD  07/23/20 3338

## 2020-07-23 NOTE — PROVIDER PROGRESS NOTES - EMERGENCY DEPT.
" Emergency Department TeleTRIAGE Encounter Note      CHIEF COMPLAINT    Chief Complaint   Patient presents with    Post-op Problem     Pt reports he had right sided sinus surgery yesterday and is c/o "packing moved", epistaxis and inability to breathe through nose.       VITAL SIGNS   Initial Vitals [07/23/20 0041]   BP Pulse Resp Temp SpO2   (!) 138/90 67 18 98 °F (36.7 °C) 97 %      MAP       --            ALLERGIES    Review of patient's allergies indicates:  No Known Allergies    PROVIDER TRIAGE NOTE  Patient is a 45 y.o. male presenting to the ED for post op problem. Had right sided septoplasty yesterday. Believes packing moved in his sleep. Minimal blood and drainage. Post-op appointment is 7/29/2020. Will defer orders.       ORDERS  Labs Reviewed - No data to display    ED Orders (720h ago, onward)    None            Virtual Visit Note: The provider triage portion of this emergency department evaluation and documentation was performed via [employer], a HIPAA-compliant telemedicine application, in concert with a tele-presenter in the room. A face to face patient evaluation with one of my colleagues will occur once the patient is placed in an emergency department room.      DISCLAIMER: This note was prepared with [employer] voice recognition transcription software. Garbled syntax, mangled pronouns, and other bizarre constructions may be attributed to that software system.  "

## 2020-07-24 ENCOUNTER — TELEPHONE (OUTPATIENT)
Dept: OTOLARYNGOLOGY | Facility: CLINIC | Age: 45
End: 2020-07-24

## 2020-07-24 ENCOUNTER — OFFICE VISIT (OUTPATIENT)
Dept: OTOLARYNGOLOGY | Facility: CLINIC | Age: 45
End: 2020-07-24
Payer: COMMERCIAL

## 2020-07-24 VITALS — SYSTOLIC BLOOD PRESSURE: 139 MMHG | HEART RATE: 62 BPM | DIASTOLIC BLOOD PRESSURE: 96 MMHG

## 2020-07-24 DIAGNOSIS — J34.89 NASAL OBSTRUCTION: Primary | ICD-10-CM

## 2020-07-24 DIAGNOSIS — Z98.890 STATUS POST NASAL SURGERY: ICD-10-CM

## 2020-07-24 PROCEDURE — 99999 PR PBB SHADOW E&M-EST. PATIENT-LVL III: ICD-10-PCS | Mod: PBBFAC,,, | Performed by: OTOLARYNGOLOGY

## 2020-07-24 PROCEDURE — 99999 PR PBB SHADOW E&M-EST. PATIENT-LVL III: CPT | Mod: PBBFAC,,, | Performed by: OTOLARYNGOLOGY

## 2020-07-24 PROCEDURE — 99024 PR POST-OP FOLLOW-UP VISIT: ICD-10-PCS | Mod: S$GLB,,, | Performed by: OTOLARYNGOLOGY

## 2020-07-24 PROCEDURE — 99024 POSTOP FOLLOW-UP VISIT: CPT | Mod: S$GLB,,, | Performed by: OTOLARYNGOLOGY

## 2020-07-24 NOTE — TELEPHONE ENCOUNTER
----- Message from Dominique Gutierrez sent at 7/24/2020  9:18 AM CDT -----  Regarding: Self/  [phone]  Type: Patient Call Back    Who called:  Patient    What is the request in detail:  Patient has a question and would like staff to give him a call.  Thank you    Would the patient rather a call back or a response via [facility]?   Call back    Best call back number:  [phone]

## 2020-07-24 NOTE — TELEPHONE ENCOUNTER
Patient calling patient with question regarding his nasal packing.  Wants to know if it is dissolvable?  Can it be removed sooner than his one week follow up?  He has not gotten no more than 5 hours of sleep since surgery very very uncomfortable.    Explained to patient that the packing is dissolvable, that he needs to make sure he is keeping it moist. Patient states he can not  complete sinus rinse with it.

## 2020-07-24 NOTE — TELEPHONE ENCOUNTER
Notified patient and scheduled him to be seen at [facility] for this afternoon 7/24/2020at 4pm as per dr rios

## 2020-07-24 NOTE — TELEPHONE ENCOUNTER
It should not be so uncomfortable normally, it may be that his claustrophobia type issues he has with his nose being obstructed are contributing as he had told me he was concerned about that before surgery. It should not be particularly painful though... So if it is actually significant pain then I would want to look at see what is going on. I had talked to the ER around 4am yesterday when he was there and offered that he could come to clinic to be checked and potentially remove packing, but I was told he was doing better and didn't feel a need to come in, so if he really can't tolerate the packing I could remove most of it in clinic early and would be happy to do so for him. His follow-up is Wednesday so could do it on Monday instead, or if really bothering him then the soonest possible would be today but we would need to squeeze [place] in [place] [facility]. Please if you could let him know these options and that I am happy to do whatever would be best for him, then just let me know how he wants to proceed.   Thanks!   Amanuel Ferreira Messages

## 2020-07-27 ENCOUNTER — TELEPHONE (OUTPATIENT)
Dept: OTOLARYNGOLOGY | Facility: CLINIC | Age: 45
End: 2020-07-27

## 2020-07-27 NOTE — TELEPHONE ENCOUNTER
Called Patient to confirm his appointment for Wednesday, he said that he did not receive the Steroid that was prescribed to him after Surgery.  I checked his Medication profile and also called his Pharmacy.  I spoke with Rickey at the Pharmacy and he assured me that the Patient picked up Amoxicillin, Ibuprofen, Bactroban Ointment, Zofran, Oxycodone, and the Medrol Dosepak.  I Called Mr. Napoles back and informed him of this and he called the Pharmacy for clarification.  Mr. Napoles called me back and he said he was able to get everything straight.

## 2020-07-29 ENCOUNTER — OFFICE VISIT (OUTPATIENT)
Dept: OTOLARYNGOLOGY | Facility: CLINIC | Age: 45
End: 2020-07-29
Payer: COMMERCIAL

## 2020-07-29 VITALS — WEIGHT: 251.88 LBS | TEMPERATURE: 97 F | BODY MASS INDEX: 33.38 KG/M2 | HEIGHT: 73 IN

## 2020-07-29 DIAGNOSIS — J31.0 RHINITIS, UNSPECIFIED TYPE: ICD-10-CM

## 2020-07-29 DIAGNOSIS — J34.3 HYPERTROPHY OF NASAL TURBINATES: ICD-10-CM

## 2020-07-29 DIAGNOSIS — J34.89 NASAL OBSTRUCTION: Primary | ICD-10-CM

## 2020-07-29 DIAGNOSIS — Z98.890 STATUS POST NASAL SURGERY: ICD-10-CM

## 2020-07-29 DIAGNOSIS — M95.0 NASAL VALVE COLLAPSE: ICD-10-CM

## 2020-07-29 PROCEDURE — 31237 NSL/SINS NDSC SURG BX POLYPC: CPT | Mod: 59,79,LT,S$GLB | Performed by: OTOLARYNGOLOGY

## 2020-07-29 PROCEDURE — 99213 OFFICE O/P EST LOW 20 MIN: CPT | Mod: 25,24,S$GLB,ICN | Performed by: OTOLARYNGOLOGY

## 2020-07-29 PROCEDURE — 3008F BODY MASS INDEX DOCD: CPT | Mod: CPTII,S$GLB,ICN, | Performed by: OTOLARYNGOLOGY

## 2020-07-29 PROCEDURE — 99213 PR OFFICE/OUTPT VISIT, EST, LEVL III, 20-29 MIN: ICD-10-PCS | Mod: 25,24,S$GLB,ICN | Performed by: OTOLARYNGOLOGY

## 2020-07-29 PROCEDURE — 31237 PR NASAL/SINUS ENDOSCOPY,BX/RMV POLYP/DEBRID: ICD-10-PCS | Mod: 79,RT,S$GLB,ICN | Performed by: OTOLARYNGOLOGY

## 2020-07-29 PROCEDURE — 3008F PR BODY MASS INDEX (BMI) DOCUMENTED: ICD-10-PCS | Mod: CPTII,S$GLB,ICN, | Performed by: OTOLARYNGOLOGY

## 2020-07-29 NOTE — PROGRESS NOTES
Subjective:      Cortez Napoles is a 45 y.o. male who comes for follow-up 1 week status-post endoscopic sinus surgery. His last clinic visit with me was on 7/24/2020. He returned on 7/24 following surgery on 7/21 for earlier post-op evaluation than his planned week post-op visit, as the nasal obstruction caused by absorbable packing and stents in his nose had been causing significant discomfort. The materials in his nose, post-op swelling, and blood/crusting were obstructive of his nasal breathing causing him distress and making it hard for him to get sleep. I evaluated him in clinic and removed obstructive material from his nose with debridement of packing and stent removal. There were no issues with the surgery and post-operative recovery otherwise. Since then he has been doing much better, having anticipated level of nasal congestion, discomfort, crusting, and minimal oozing for procedures performed. No fevers, chills, or concerns for acute infection reported. No abnormal severity of post-operative pain since time of surgery. He has taken post-operative medications as prescribed, adhered to post-operative instructions including initial activity restrictions, and has performed recommended post-operative cares including nasal saline irrigations.      Surgical procedures performed on 7/21/20:  1. Diagnostic nasal endoscopy  2. Septoplasty  3. Intranasal ablation of right septal swell body  4. Intranasal ablation of left septal swell body  5. Bilateral partial resection of the inferior turbinates with therapeutic outfracture     Findings at time of surgery:   Cortez Napoles is a 45 y.o. male with a history of significant nasal congestion and partial nasal obstruction. Diagnostic nasal endoscopy was indicated to evaluate these chronic sinonasal symptoms which have not improved with medical management. Internal nasal valve compromise was identified with contributions from inferior turbinate hypertrophy, nasal septum  "deviation, and septal swell body hypertrophy. Planned procedures were indicated to address these sources of nasal airway obstruction and improve nasal airflow bilaterally.    Past Medical History  He has no past medical history on file.    Past Surgical History  He has a past surgical history that includes Knee arthroscopy w/ meniscal repair; Fracture surgery (Left); Endoscopic nasal septoplasty (N/A, 7/21/2020); and Nasal endoscopy (Bilateral, 7/21/2020).    Allergies  He has No Known Allergies.    Medications   He has a current medication list which includes the following prescription(s): diphenhydramine, fluticasone propionate, ibuprofen, methylprednisolone, multivitamin, ondansetron, oxycodone, and esomeprazole.    Review of Systems  Previous ROS reviewed and updated as per HPI with pertinent positives and negatives including:  Eyes: No change in vision, including no new double vision  Nose: Post-operative changes as per HPI, no other new symptoms present  Hematologic: No unusual bleeding  Respiratory: No respiratory distress, nasal breathing changes as per HPI  Neuro: Post-operative pain as per HPI, no other acute neurologic changes    New complete ROS performed today:  Review of Systems   Constitutional: Negative.    HENT: Positive for congestion. Negative for hoarse voice.    Eyes: Negative.    Respiratory: Negative.    Cardiovascular: Negative.    Gastrointestinal: Negative.    Endocrine: Negative.    Genitourinary: Negative.    Musculoskeletal: Negative.    Skin: Negative.    Allergic/Immunologic: Negative.    Neurological: Negative.    Hematological: Negative.    Psychiatric/Behavioral: Negative.      Objective:     Temp 97.1 °F (36.2 °C)   Ht 6' 1" (1.854 m)   Wt 114.3 kg (251 lb 14 oz)   BMI 33.23 kg/m²        Constitutional:   He appears well-developed and well-nourished. He is active. He does not appear ill. No distress. Normal speech.  No hoarse voice and breathy voice.      Head:  Normocephalic " and atraumatic. No skin lesions. Facial strength is normal.      Ears:    Right Ear: No drainage, swelling or tenderness. No decreased hearing is noted.   Left Ear: No drainage, swelling or tenderness. No decreased hearing is noted.     Nose:  Mucosal edema (mucosal edema consistent with recent sinonasal procedures and appropriate for post-operative course) and sinus tenderness (Nasal tenderness appropriate for post-operative recovery) present. No rhinorrhea, septal deviation, nasal septal hematoma or polyps. No epistaxis (no active epistaxis is present, some old blood/crusting related to recent surgery). No turbinate hypertrophy.      Mouth/Throat  Oropharynx clear and moist without lesions or asymmetry and normal uvula midline. No oral lesions. No oropharyngeal exudate, posterior oropharyngeal edema or posterior oropharyngeal erythema.     Neck:  Neck normal without thyromegaly masses, asymmetry, normal tracheal structure, crepitus, and tenderness, phonation normal and full range of motion with neck supple. No edema, no erythema and no neck rigidity present.     Pulmonary/Chest:   Effort normal.     Psychiatric:   He has a normal mood and affect. His speech is normal and behavior is normal.     Neurological:   He has neurological normal, alert and oriented. No cranial nerve deficit or sensory deficit.     Skin:   No abrasions, lacerations, lesions, or rashes.     Procedure  Endoscopic Sinonasal Debridement  Indications: Debridement was indicated for persistent crusting and nasal debris as part of necessary postoperative care and to promote optimal results from surgery. Failure to perform adequate postoperative debridement is known to result in poor healing, scarring, and worse surgical outcomes.      Description of procedure: Rigid nasal endoscopy was used to assist with debridement of the sinonasal cavities. The nasal cavity was decongested and anesthetized.  A rigid nasal endoscope was introduced into the nasal  cavity and used to evaluate the sinonasal cavities, mucosa, sinus ostia and turbinates bilaterally. Crusting/necrotic tissue was removed with forceps from the osteomeatal complex bilaterally and over raw healing areas of mucosal edges. Additional debris/necrotic material, blood clots, and other retained materials were removed from the nasal cavities bilaterally with forceps and suction. The patient tolerated the procedure well and there were no complications/adverse events.      Findings/Assessment: Successful debridement and evaluation with endoscopy. Crusting present and post-operative changes including anticipated level of mucosal edema associated with recent surgery. Overall well healing following debridement.     Data Reviewed    Operative report reviewed and procedures performed as well as key operative findings are described in history above.     Past records were reviewed to compare pre-op to current post-op signs/symptoms and findings, and determine significant changes.       Assessment:     Doing well following bilateral endoscopic nasal surgery, much improved with debridement of additional nasal debris and crusting related to surgery. Post-operative edema and anticipated congestion and partial obstruction improving, with overall improvement in nasal airway bilaterally from surgery.  1. Nasal obstruction    2. Status post nasal surgery    3. Hypertrophy of nasal turbinates    4. Nasal valve collapse    5. Rhinitis, unspecified type         Plan:     We discussed post-operative cares following surgery and ongoing medical management. Importance of medical therapies following surgery were also reviewed including saline irrigations. All questions were answered. He will continue with medical management consisting of nasal saline irrigations and nasal ointment and moisture. Topical nasal steroid spray not yet resumed and waiting until further post-operative healing. Additional recommendations made today included  review of post-op instructions and use of Afrin if needed during the post-operative course for bleeding. We will continue to follow his response to surgery and this ongoing medical therapy. Follow-up is scheduled. I encouraged him to call with any questions or concerns in the meantime.        Amanuel Banegas MD    Rhinology, Allergy, and Sinus-Skull Base Surgery    Department of Otorhinolaryngology    [facility]    Phone  [phone]    Fax      [phone]

## 2020-08-03 NOTE — PROGRESS NOTES
Subjective:      Cortez Napoles is a 45 y.o. male who comes for post-op evaluation following recent surgery on 7/21/20. His last clinic visit with me was on 7/6/2020. He returns for earlier post-op evaluation than his planned visit in about 3 days, as the nasal obstruction caused by absorbable packing and stents in his nose has been causing significant discomfort. He is particularly sensitive to issues with nasal breathing in general and so the materials in his nose, post-op swelling, and blood/crusting which is now obstructive of his nasal breathing has been causing him distress and making it hard for him to get any sleep at all for the last few days. He had called about this and I suggested he come in to clinic so I could evaluate and remove obstructive material from his nose so he could be more comfortable and I could make sure there were no issues with the surgery and post-operative recovery.  He denies abnormal discharge, pain, or unusual bleeding at this point. No fevers, chills, or concerns for acute infection reported. No abnormal severity of post-operative pain since time of surgery. He has taken post-operative medications as prescribed, adhered to post-operative instructions including initial activity restrictions, and has performed recommended post-operative cares including nasal saline irrigations.     Surgical procedures performed on 7/21/20:  1. Diagnostic nasal endoscopy  2. Septoplasty  3. Intranasal ablation of right septal swell body  4. Intranasal ablation of left septal swell body  5. Bilateral partial resection of the inferior turbinates with therapeutic outfracture    Findings at time of surgery:   Cortez Napoles is a 45 y.o. male with a history of significant nasal congestion and partial nasal obstruction. Diagnostic nasal endoscopy was indicated to evaluate these chronic sinonasal symptoms which have not improved with medical management. Internal nasal valve compromise was identified with  contributions from inferior turbinate hypertrophy, nasal septum deviation, and septal swell body hypertrophy. Planned procedures were indicated to address these sources of nasal airway obstruction and improve nasal airflow bilaterally.    Past Medical History  He has no past medical history on file.    Past Surgical History  He has a past surgical history that includes Knee arthroscopy w/ meniscal repair; Fracture surgery (Left); Endoscopic nasal septoplasty (N/A, 7/21/2020); and Nasal endoscopy (Bilateral, 7/21/2020).    Allergies  He has No Known Allergies.    Medications   He has a current medication list which includes the following prescription(s): diphenhydramine, fluticasone propionate, ibuprofen, methylprednisolone, multivitamin, mupirocin, ondansetron, oxycodone, and esomeprazole.    Review of Systems  Pertinent positives and negatives including:  Eyes: No change in vision, including no new double vision  Nose: Post-operative changes as per HPI, no other new symptoms present  Hematologic: No unusual bleeding  Respiratory: No respiratory distress, nasal breathing changes as per HPI  Neuro: Post-operative pain as per HPI, no acute neurologic changes    Review of Systems   Constitutional: Negative for chills and fever.   HENT: Positive for congestion and nosebleeds. Negative for facial swelling, hoarse voice, sinus pain, trouble swallowing and voice change.    Eyes: Negative for photophobia and visual disturbance.   Respiratory: Negative for cough and shortness of breath.    Endocrine: Negative for cold intolerance and heat intolerance.   Allergic/Immunologic: Negative for environmental allergies and food allergies.   Neurological: Negative for weakness and numbness.   Hematological: Does not bruise/bleed easily.       Objective:     BP (!) 139/96   Pulse 62        Constitutional:   He appears well-developed and well-nourished. He is active. He does not appear ill. No distress. Normal speech.  No hoarse voice  and breathy voice.      Head:  Normocephalic and atraumatic. No skin lesions. Facial strength is normal.      Ears:    Right Ear: No drainage, swelling or tenderness. No decreased hearing is noted.   Left Ear: No drainage, swelling or tenderness. No decreased hearing is noted.     Nose:  Mucosal edema (mucosal edema consistent with recent sinonasal procedures and appropriate for post-operative course) and sinus tenderness (tenderness appropriate for post-operative recovery from recent sinonasal surgery) present. No rhinorrhea, septal deviation, nasal septal hematoma or polyps. No epistaxis (no active epistaxis is present, some old blood/crusting related to recent surgery). Foreign body (nasal splints and packing ) is present. No turbinate hypertrophy.      Mouth/Throat  Oropharynx clear and moist without lesions or asymmetry and normal uvula midline. No oral lesions. No oropharyngeal exudate, posterior oropharyngeal edema or posterior oropharyngeal erythema.     Neck:  Neck normal without thyromegaly masses, asymmetry, normal tracheal structure, crepitus, and tenderness, phonation normal and full range of motion with neck supple. No edema, no erythema and no neck rigidity present.     Pulmonary/Chest:   Effort normal.     Psychiatric:   He has a normal mood and affect. His speech is normal and behavior is normal.     Neurological:   He has neurological normal, alert and oriented. No cranial nerve deficit or sensory deficit.     Skin:   No abrasions, lacerations, lesions, or rashes.       Procedure    Removed nasal splints and suctioned out absorbable packing material from bilateral nares. Tolerated well and with immediate improvement in nasal breathing and relief of nasal airway obstruction.     Data Reviewed    Operative report reviewed and procedures performed as well as key operative findings are described in history above.    Past records were reviewed to compare pre-op to current post-op signs/symptoms and  findings, and determine significant changes.       Assessment:     Doing well following bilateral endoscopic nasal surgery, but with nasal obstruction related to packing, blood clots, splints, and post-operative swelling/edema. Nasal obstruction was relieved bilaterally with debridement and removal of stents.     1. Nasal obstruction    2. Status post nasal surgery         Plan:     We discussed post-operative cares following surgery and ongoing medical management. Importance of medical therapies following surgery were also reviewed including saline irrigations. All questions were answered. He will continue with medical management consisting of nasal saline irrigations and topical nasal steroid spray. Additional recommendations made today included review of post-op instructions and use of Afrin if needed during the post-operative course for bleeding. We will continue to follow his response to surgery and this ongoing medical therapy. Follow-up is scheduled. I encouraged him to call with any questions or concerns in the meantime.          Amanuel Banegas MD    Rhinology, Allergy, and Sinus-Skull Base Surgery    Department of Otorhinolaryngology    [facility]    Phone  [phone]    Fax      [phone]

## 2020-08-04 ENCOUNTER — TELEPHONE (OUTPATIENT)
Dept: OTOLARYNGOLOGY | Facility: CLINIC | Age: 45
End: 2020-08-04

## 2020-08-04 NOTE — TELEPHONE ENCOUNTER
----- Message from Markel Ricketts sent at 8/4/2020 12:19 PM CDT -----  Regarding: self  Type: Patient Call Back    Who called: Self    What is the request in detail: pt would like a call about some concerns since his surgery    Can the clinic reply by [facility]? no    Would the patient rather a call back or a response via [facility]? call    Best call back number: [phone]

## 2020-08-04 NOTE — TELEPHONE ENCOUNTER
Spoke to patient. He would like to know when will it be safe to sleep lying down in his bed instead of the recliner? Also when is it ok to have intimacy?

## 2020-08-06 ENCOUNTER — LAB VISIT (OUTPATIENT)
Dept: PRIMARY CARE CLINIC | Facility: CLINIC | Age: 45
End: 2020-08-06
Payer: COMMERCIAL

## 2020-08-06 ENCOUNTER — PATIENT MESSAGE (OUTPATIENT)
Dept: OTOLARYNGOLOGY | Facility: CLINIC | Age: 45
End: 2020-08-06

## 2020-08-06 DIAGNOSIS — J34.89 NASAL OBSTRUCTION: ICD-10-CM

## 2020-08-06 PROCEDURE — U0003 INFECTIOUS AGENT DETECTION BY NUCLEIC ACID (DNA OR RNA); SEVERE ACUTE RESPIRATORY SYNDROME CORONAVIRUS 2 (SARS-COV-2) (CORONAVIRUS DISEASE [COVID-19]), AMPLIFIED PROBE TECHNIQUE, MAKING USE OF HIGH THROUGHPUT TECHNOLOGIES AS DESCRIBED BY CMS-2020-01-R: HCPCS

## 2020-08-06 NOTE — TELEPHONE ENCOUNTER
Patient wants to know if he can go back to work on Monday, he is a  with lifting and bending  I looked in the previous notes and no strenuous for 2 wks.

## 2020-08-07 LAB — SARS-COV-2 RNA RESP QL NAA+PROBE: NOT DETECTED

## 2020-08-12 ENCOUNTER — OFFICE VISIT (OUTPATIENT)
Dept: OTOLARYNGOLOGY | Facility: CLINIC | Age: 45
End: 2020-08-12
Payer: COMMERCIAL

## 2020-08-12 VITALS
BODY MASS INDEX: 33.27 KG/M2 | WEIGHT: 251 LBS | HEIGHT: 73 IN | DIASTOLIC BLOOD PRESSURE: 80 MMHG | SYSTOLIC BLOOD PRESSURE: 122 MMHG

## 2020-08-12 DIAGNOSIS — J34.89 NASAL CRUSTING: ICD-10-CM

## 2020-08-12 DIAGNOSIS — M95.0 NASAL VALVE COLLAPSE: ICD-10-CM

## 2020-08-12 DIAGNOSIS — J34.3 HYPERTROPHY OF NASAL TURBINATES: ICD-10-CM

## 2020-08-12 DIAGNOSIS — J34.89 NASAL OBSTRUCTION: Primary | ICD-10-CM

## 2020-08-12 DIAGNOSIS — Z98.890 STATUS POST NASAL SURGERY: ICD-10-CM

## 2020-08-12 PROCEDURE — 3008F BODY MASS INDEX DOCD: CPT | Mod: CPTII,S$GLB,ICN, | Performed by: OTOLARYNGOLOGY

## 2020-08-12 PROCEDURE — 31237 PR NASAL/SINUS ENDOSCOPY,BX/RMV POLYP/DEBRID: ICD-10-PCS | Mod: 79,RT,S$GLB,ICN | Performed by: OTOLARYNGOLOGY

## 2020-08-12 PROCEDURE — 3008F PR BODY MASS INDEX (BMI) DOCUMENTED: ICD-10-PCS | Mod: CPTII,S$GLB,ICN, | Performed by: OTOLARYNGOLOGY

## 2020-08-12 PROCEDURE — 99213 OFFICE O/P EST LOW 20 MIN: CPT | Mod: 25,24,S$GLB,ICN | Performed by: OTOLARYNGOLOGY

## 2020-08-12 PROCEDURE — 31237 NSL/SINS NDSC SURG BX POLYPC: CPT | Mod: 79,RT,S$GLB,ICN | Performed by: OTOLARYNGOLOGY

## 2020-08-12 PROCEDURE — 99213 PR OFFICE/OUTPT VISIT, EST, LEVL III, 20-29 MIN: ICD-10-PCS | Mod: 25,24,S$GLB,ICN | Performed by: OTOLARYNGOLOGY

## 2020-08-12 NOTE — PROGRESS NOTES
"  Subjective:      Cortez Napoles is a 45 y.o. male who comes for follow-up {display:47073:o:"over 3 months","nearly 3 months","3 weeks","2 weeks","1 week","***"} status-post endoscopic sinus surgery. His last clinic visit with me was on 7/29/2020.  He has been doing well overall without significant issues. He denies abnormal discharge, pain, or unusual bleeding at this point. Some anticipated level of severity of post-operative pain with location of operative site of nose and paranasal sinuses has been present, with duration since time of surgery, and modifying factors include response to minimal analgesic use. He has taken post-operative medications as prescribed, adhered to post-operative instructions including activity restrictions, and has performed recommended post-operative cares including nasal saline irrigations. Recovery has overall followed an expected course for the procedures performed and no other new or unusual associated symptoms are present.     SNOT-22 score = ***  NOSE score = ***  ETDQ-7 score = ***  Global QOL assessment ("overall, how do you feel today?" from 1 "very bad" to 10 "very good"): ***    Surgical procedures performed on ***:  ***    Findings at time of surgery:  ***    Past Medical History  He has no past medical history on file.    Past Surgical History  He has a past surgical history that includes Knee arthroscopy w/ meniscal repair; Fracture surgery (Left); Endoscopic nasal septoplasty (N/A, 7/21/2020); and Nasal endoscopy (Bilateral, 7/21/2020).    Allergies  He has No Known Allergies.    Medications   He has a current medication list which includes the following prescription(s): diphenhydramine, fluticasone propionate, ibuprofen, esomeprazole, methylprednisolone, multivitamin, ondansetron, and oxycodone.    Review of Systems  Previous ROS reviewed and updated as per HPI with pertinent positives and negatives including:  Eyes: No change in vision, including no new double " "vision  Nose: Post-operative changes as per HPI, no other new symptoms present  Hematologic: No unusual bleeding  Respiratory: No respiratory distress, nasal breathing changes as per HPI  Neuro: Post-operative pain as per HPI, no other acute neurologic changes    New complete ROS performed today:  Review of Systems    Objective:     /80 (BP Location: Right arm, Patient Position: Sitting, BP Method: Large (Manual))   Ht 6' 1" (1.854 m)   Wt 113.9 kg (251 lb)   BMI 33.12 kg/m²      Physical Exam    Procedure    {display:76364:a:"Endoscopic debridement performed.  See procedure note for details. Crusting, post-operative debris, and retained secretions were removed as planned and indicated for recent endoscopic sinonasal surgery.","Nasal endoscopy performed.  See procedure note.","Flexible fiberoptic laryngoscopy performed.  See procedure note.","None"}    Data Reviewed    Pathology report {display:19197:o:"pending","indicated chronic inflammation with rare eosinophils","indicated chronic inflammation with eosinophilia","indicated non-eosinophilic chronic inflammation"}.      Results of cultures obtained were reviewed and demonstrated ***    Operative report reviewed and procedures performed as well as key operative findings are described in history above.      Assessment:     {display:64651:o:"Doing well"} following {display:23904:o:"right","left","bilateral"} endoscopic sinus surgery.  {display:27379:o:"He also underwent septum/turbinate surgery which is unrelated to the reason for today's visit."}    No diagnosis found.     Plan:     We discussed post-operative cares following surgery and ongoing medical management. Importance of scheduled debridements to remove crusting, debris, and potential scar tissue was reviewed again today, as this is associated with optimal healing and overall improved surgical outcome. Importance of medical therapies following surgery were also reviewed including saline irrigations. " "All questions were answered. He will continue with medical management consisting of {display:29986:o:"nasal saline irrigations and topical nasal steroid spray","nasal saline irrigations","nasal irrigations with topical budesonide in saline solution"}.{display:68056:o:" "," After reviewing findings from today's evaluation and discussing options for ongoing medical management the patient he has elected to proceed with addition of medical management using topical nasal budesonide delivered in nasal saline irrigations, and this has been prescribed. Specific instructions on use of budesonide irrigations were provided. "," Based on culture results he has elected to proceed with addition of culture-directed antibiotic therapy to treat identified pathogens, and a prescription for *** was provided based on culture and sensitivity data. "," *** "}We will continue to follow his response to surgery and this ongoing medical therapy. Follow-up is scheduled. I encouraged him to call with any questions or concerns in the meantime.          Amanuel Banegas MD    Rhinology, Allergy, and Sinus-Skull Base Surgery    Department of Otorhinolaryngology    [facility]    Phone  [phone]    Fax      [phone]    "

## 2020-08-12 NOTE — PROGRESS NOTES
Subjective:      Cortez Napoles is a 45 y.o. male who comes for follow-up status-post endoscopic sinus surgery. His last clinic visit with me was on 7/29/2020. Initially materials in his nose, post-op swelling, and blood/crusting were obstructive of his nasal breathing causing him distress. I evaluated him in clinic and removed obstructive material from his nose with debridement of packing and stent removal, which significantly improved these symptoms. Some inital post-operative bleeding within range of normal for procedures performed, and which has since resolved. There have been no issues with the surgery and post-operative recovery otherwise. Anticipated level of nasal congestion, discomfort, and crusting which continues to improve with overall post-operative healing from surgery. No abnormal severity of post-operative pain since time of surgery. He has taken post-operative medications as prescribed, adhered to post-operative instructions including initial activity restrictions, and has performed recommended post-operative cares including nasal saline irrigations which he is doing frequently (5 times daily). Has not resumed nasal steroid spray or other topical therapies. Overall reports significant improvements in nasal breathing with successful treatment of nasal obstruction/congestion present prior to surgery.     Surgical procedures performed on 7/21/20:  1. Diagnostic nasal endoscopy  2. Septoplasty  3. Intranasal ablation of right septal swell body  4. Intranasal ablation of left septal swell body  5. Bilateral partial resection of the inferior turbinates with therapeutic outfracture     Findings at time of surgery:   Cortez Napoles is a 45 y.o. male with a history of significant nasal congestion and partial nasal obstruction. Diagnostic nasal endoscopy was indicated to evaluate these chronic sinonasal symptoms which have not improved with medical management. Internal nasal valve compromise was identified  with contributions from inferior turbinate hypertrophy, nasal septum deviation, and septal swell body hypertrophy. Planned procedures were indicated to address these sources of nasal airway obstruction and improve nasal airflow bilaterally.    Past Medical History  He has no past medical history on file.    Past Surgical History  He has a past surgical history that includes Knee arthroscopy w/ meniscal repair; Fracture surgery (Left); Endoscopic nasal septoplasty (N/A, 7/21/2020); and Nasal endoscopy (Bilateral, 7/21/2020).    Allergies  He has No Known Allergies.    Medications   He has a current medication list which includes the following prescription(s): diphenhydramine, fluticasone propionate, ibuprofen, esomeprazole, methylprednisolone, multivitamin, ondansetron, and oxycodone.    Review of Systems  Review of Systems   Constitutional: Negative.  Negative for chills, fatigue and fever.   HENT: Positive for nosebleeds. Negative for congestion, dental problem, ear discharge, ear pain, facial swelling, hearing loss, postnasal drip, rhinorrhea, sinus pressure, sore throat, tinnitus, trouble swallowing and voice change.         Itchy Nose   Eyes: Negative.  Negative for photophobia, discharge, itching and visual disturbance.   Respiratory: Negative.  Negative for apnea, cough, shortness of breath and wheezing.    Cardiovascular: Negative.  Negative for chest pain and palpitations.   Gastrointestinal: Negative.  Negative for abdominal pain, nausea and vomiting.   Endocrine: Negative.  Negative for cold intolerance and heat intolerance.   Genitourinary: Negative.  Negative for difficulty urinating.   Musculoskeletal: Negative.  Negative for arthralgias, back pain, myalgias and neck pain.   Skin: Negative.  Negative for rash.   Allergic/Immunologic: Negative.  Negative for environmental allergies and food allergies.   Neurological: Negative.  Negative for dizziness, seizures, syncope, light-headedness and headaches.  "  Hematological: Negative.  Negative for adenopathy. Does not bruise/bleed easily.   Psychiatric/Behavioral: Negative.  Negative for decreased concentration, dysphoric mood and sleep disturbance. The patient is not nervous/anxious.    All other systems reviewed and are negative.    Objective:     /80 (BP Location: Right arm, Patient Position: Sitting, BP Method: Large (Manual))   Ht 6' 1" (1.854 m)   Wt 113.9 kg (251 lb)   BMI 33.12 kg/m²        Constitutional:   He appears well-developed and well-nourished. He is active. He does not appear ill. No distress. Normal speech.  No hoarse voice and breathy voice.      Head:  Normocephalic and atraumatic. No skin lesions. Facial strength is normal.      Ears:    Right Ear: No drainage, swelling or tenderness. No decreased hearing is noted.   Left Ear: No drainage, swelling or tenderness. No decreased hearing is noted.     Nose:  Mucosal edema (mucosal edema consistent with recent sinonasal procedures and appropriate for post-operative course) and sinus tenderness (Nasal tenderness appropriate for post-operative recovery) present. No rhinorrhea, septal deviation, nasal septal hematoma or polyps. No epistaxis (no active epistaxis is present, some old blood/crusting related to recent surgery). No turbinate hypertrophy.      Mouth/Throat  Oropharynx clear and moist without lesions or asymmetry and normal uvula midline. No oral lesions. No oropharyngeal exudate, posterior oropharyngeal edema or posterior oropharyngeal erythema.     Neck:  Neck normal without thyromegaly masses, asymmetry, normal tracheal structure, crepitus, and tenderness, phonation normal and full range of motion with neck supple. No edema, no erythema and no neck rigidity present.     Pulmonary/Chest:   Effort normal.     Psychiatric:   He has a normal mood and affect. His speech is normal and behavior is normal.     Neurological:   He has neurological normal, alert and oriented. No cranial nerve " deficit or sensory deficit.     Skin:   No abrasions, lacerations, lesions, or rashes.     Procedure  Endoscopic Sinonasal Debridement  Indications: Debridement was indicated for persistent crusting and nasal debris as part of necessary postoperative care and to promote optimal results from surgery. Failure to perform adequate postoperative debridement is known to result in poor healing, scarring, and worse surgical outcomes.      Description of procedure: Rigid nasal endoscopy was used to assist with debridement of the sinonasal cavities. The nasal cavity was decongested and anesthetized.  A rigid nasal endoscope was introduced into the nasal cavity and used to evaluate the sinonasal cavities, mucosa, sinus ostia and turbinates bilaterally. Crusting/necrotic tissue was removed with forceps from the osteomeatal complex bilaterally and over raw healing areas of mucosal edges. Additional debris/necrotic material, blood clots, and other retained materials were removed from the nasal cavities bilaterally with forceps and suction. The patient tolerated the procedure well and there were no complications/adverse events.      Findings/Assessment: Successful debridement and evaluation with endoscopy. Crusting present and post-operative changes including anticipated level of mucosal edema associated with recent surgery. Overall well healing following debridement. Images from endoscopy demonstrating crusting/debris prior to debridement, all of which was successfully removed with the debridement:    Left                  Right                          Data Reviewed    Operative report reviewed and procedures performed as well as key operative findings are described in history above.     Past records were reviewed to compare pre-op to current post-op signs/symptoms and findings, and determine significant changes.       Assessment:     Doing well following bilateral endoscopic nasal surgery. Post-operative edema and anticipated  congestion and partial obstruction continues to improve, and persistent nasal debris and crusting related to surgery successfully removed with debridement today. Overall improvement in nasal airway bilaterally from surgery.  1. Nasal obstruction    2. Nasal crusting    3. Status post nasal surgery    4. Hypertrophy of nasal turbinates    5. Nasal valve collapse         Plan:     We discussed post-operative cares following surgery and ongoing medical management. Importance of medical therapies following surgery were also reviewed including saline irrigations. All questions were answered. He will continue with medical management consisting of nasal saline irrigations and nasal ointment and moisture. Advised resuming topical nasal steroid spray at this point, which had been held previously while awaiting further post-operative healing. Return to fully level of activity and work duty was discussed today and clearance forms were completed and returned to the patient to submit (scanned into medical record as well). We will continue to follow his response to surgery and this ongoing medical therapy. Follow-up in 4-6 months or sooner if needed. I encouraged him to call with any questions or concerns in the meantime.        Amanuel Banegas MD    Rhinology, Allergy, and Sinus-Skull Base Surgery    Department of Otorhinolaryngology    [facility]    Phone  [phone]    Fax      [phone]

## 2020-12-11 ENCOUNTER — OFFICE VISIT (OUTPATIENT)
Dept: INTERNAL MEDICINE | Facility: CLINIC | Age: 45
End: 2020-12-11
Payer: COMMERCIAL

## 2020-12-11 ENCOUNTER — LAB VISIT (OUTPATIENT)
Dept: LAB | Facility: HOSPITAL | Age: 45
End: 2020-12-11
Attending: NURSE PRACTITIONER
Payer: COMMERCIAL

## 2020-12-11 VITALS
SYSTOLIC BLOOD PRESSURE: 112 MMHG | RESPIRATION RATE: 18 BRPM | HEIGHT: 73 IN | OXYGEN SATURATION: 98 % | HEART RATE: 62 BPM | WEIGHT: 268.31 LBS | TEMPERATURE: 98 F | BODY MASS INDEX: 35.56 KG/M2 | DIASTOLIC BLOOD PRESSURE: 74 MMHG

## 2020-12-11 DIAGNOSIS — A60.01 HERPES SIMPLEX INFECTION OF PENIS: ICD-10-CM

## 2020-12-11 DIAGNOSIS — M79.89 SWELLING OF BOTH HANDS: ICD-10-CM

## 2020-12-11 DIAGNOSIS — R53.83 FATIGUE, UNSPECIFIED TYPE: Primary | ICD-10-CM

## 2020-12-11 DIAGNOSIS — R60.0 LOWER EXTREMITY EDEMA: ICD-10-CM

## 2020-12-11 DIAGNOSIS — R53.83 FATIGUE, UNSPECIFIED TYPE: ICD-10-CM

## 2020-12-11 LAB
ALBUMIN SERPL BCP-MCNC: 4.1 G/DL (ref 3.5–5.2)
ALP SERPL-CCNC: 53 U/L (ref 55–135)
ALT SERPL W/O P-5'-P-CCNC: 68 U/L (ref 10–44)
ANION GAP SERPL CALC-SCNC: 6 MMOL/L (ref 8–16)
AST SERPL-CCNC: 57 U/L (ref 10–40)
BASOPHILS # BLD AUTO: 0.04 K/UL (ref 0–0.2)
BASOPHILS NFR BLD: 0.7 % (ref 0–1.9)
BILIRUB SERPL-MCNC: 0.4 MG/DL (ref 0.1–1)
BUN SERPL-MCNC: 14 MG/DL (ref 6–20)
CALCIUM SERPL-MCNC: 9.2 MG/DL (ref 8.7–10.5)
CHLORIDE SERPL-SCNC: 105 MMOL/L (ref 95–110)
CO2 SERPL-SCNC: 29 MMOL/L (ref 23–29)
CREAT SERPL-MCNC: 1.5 MG/DL (ref 0.5–1.4)
DIFFERENTIAL METHOD: ABNORMAL
EOSINOPHIL # BLD AUTO: 0.1 K/UL (ref 0–0.5)
EOSINOPHIL NFR BLD: 1 % (ref 0–8)
ERYTHROCYTE [DISTWIDTH] IN BLOOD BY AUTOMATED COUNT: 14.4 % (ref 11.5–14.5)
EST. GFR  (AFRICAN AMERICAN): >60 ML/MIN/1.73 M^2
EST. GFR  (NON AFRICAN AMERICAN): 54.7 ML/MIN/1.73 M^2
GLUCOSE SERPL-MCNC: 85 MG/DL (ref 70–110)
HCT VFR BLD AUTO: 48.2 % (ref 40–54)
HGB BLD-MCNC: 16.1 G/DL (ref 14–18)
IMM GRANULOCYTES # BLD AUTO: 0.01 K/UL (ref 0–0.04)
IMM GRANULOCYTES NFR BLD AUTO: 0.2 % (ref 0–0.5)
LYMPHOCYTES # BLD AUTO: 2.8 K/UL (ref 1–4.8)
LYMPHOCYTES NFR BLD: 45 % (ref 18–48)
MCH RBC QN AUTO: 31.4 PG (ref 27–31)
MCHC RBC AUTO-ENTMCNC: 33.4 G/DL (ref 32–36)
MCV RBC AUTO: 94 FL (ref 82–98)
MONOCYTES # BLD AUTO: 0.5 K/UL (ref 0.3–1)
MONOCYTES NFR BLD: 8 % (ref 4–15)
NEUTROPHILS # BLD AUTO: 2.8 K/UL (ref 1.8–7.7)
NEUTROPHILS NFR BLD: 45.1 % (ref 38–73)
NRBC BLD-RTO: 0 /100 WBC
PLATELET # BLD AUTO: 206 K/UL (ref 150–350)
PMV BLD AUTO: 11.2 FL (ref 9.2–12.9)
POTASSIUM SERPL-SCNC: 4.7 MMOL/L (ref 3.5–5.1)
PROT SERPL-MCNC: 7.6 G/DL (ref 6–8.4)
RBC # BLD AUTO: 5.12 M/UL (ref 4.6–6.2)
SODIUM SERPL-SCNC: 140 MMOL/L (ref 136–145)
WBC # BLD AUTO: 6.15 K/UL (ref 3.9–12.7)

## 2020-12-11 PROCEDURE — 99999 PR PBB SHADOW E&M-EST. PATIENT-LVL III: ICD-10-PCS | Mod: PBBFAC,,, | Performed by: NURSE PRACTITIONER

## 2020-12-11 PROCEDURE — 36415 COLL VENOUS BLD VENIPUNCTURE: CPT | Mod: PO

## 2020-12-11 PROCEDURE — 3008F BODY MASS INDEX DOCD: CPT | Mod: CPTII,S$GLB,, | Performed by: NURSE PRACTITIONER

## 2020-12-11 PROCEDURE — 3008F PR BODY MASS INDEX (BMI) DOCUMENTED: ICD-10-PCS | Mod: CPTII,S$GLB,, | Performed by: NURSE PRACTITIONER

## 2020-12-11 PROCEDURE — 85025 COMPLETE CBC W/AUTO DIFF WBC: CPT

## 2020-12-11 PROCEDURE — 99214 OFFICE O/P EST MOD 30 MIN: CPT | Mod: S$GLB,,, | Performed by: NURSE PRACTITIONER

## 2020-12-11 PROCEDURE — 1126F PR PAIN SEVERITY QUANTIFIED, NO PAIN PRESENT: ICD-10-PCS | Mod: S$GLB,,, | Performed by: NURSE PRACTITIONER

## 2020-12-11 PROCEDURE — 99214 PR OFFICE/OUTPT VISIT, EST, LEVL IV, 30-39 MIN: ICD-10-PCS | Mod: S$GLB,,, | Performed by: NURSE PRACTITIONER

## 2020-12-11 PROCEDURE — 99999 PR PBB SHADOW E&M-EST. PATIENT-LVL III: CPT | Mod: PBBFAC,,, | Performed by: NURSE PRACTITIONER

## 2020-12-11 PROCEDURE — 1126F AMNT PAIN NOTED NONE PRSNT: CPT | Mod: S$GLB,,, | Performed by: NURSE PRACTITIONER

## 2020-12-11 PROCEDURE — 80053 COMPREHEN METABOLIC PANEL: CPT

## 2020-12-11 RX ORDER — VALACYCLOVIR HYDROCHLORIDE 500 MG/1
500 TABLET, FILM COATED ORAL DAILY
Qty: 30 TABLET | Refills: 11 | Status: SHIPPED | OUTPATIENT
Start: 2020-12-11 | End: 2021-07-26

## 2020-12-11 NOTE — PROGRESS NOTES
[facility] Note    Chief Complaint    No chief complaint on file.    History of Present Illness      Cortez Napolse is a 45 y.o. male patient who presents today for f/u with intermittent swelling noted to bilateral hands and feet.  Patient reports that the swelling lasts about a day and a half think goes away.  Patient reports drinks a gal of water a day, works out at the gym almost every day, eats clean.  Denies fever shortness of breath, congestion, chest pain, urinary concerns-dark or cloudy urine, redness or warmth to skin.    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      Patient also complains of 3 flares of genital herpes this year.  Is requesting daily preventative medication.    Problem List Items Addressed This Visit     None          Health Maintenance   Topic Date Due    Hepatitis C Screening  1975    TETANUS VACCINE  04/14/1991    Lipid Panel  08/15/2024       No past medical history on file.    Past Surgical History:   Procedure Laterality Date    ENDOSCOPIC NASAL SEPTOPLASTY N/A 7/21/2020    Procedure: SEPTOPLASTY, NOSE, ENDOSCOPIC;  Surgeon: Amanuel Banegas MD;  Location: [facility] OR;  Service: ENT;  Laterality: N/A;  NO DISC AVAILABLE  RN Pre Op 7-, ---COVID NEGATIVE  ON  7-20-20 [place]    FRACTURE SURGERY Left     ankle, with ligament repair also    KNEE ARTHROSCOPY W/ MENISCAL REPAIR      NASAL ENDOSCOPY Bilateral 7/21/2020    Procedure: ENDOSCOPY, NOSE;  Surgeon: Amanuel Banegas MD;  Location: [facility] OR;  Service: ENT;  Laterality: Bilateral;       family history includes Colon cancer in his maternal grandfather.     Social History     Tobacco Use    Smoking status: Never Smoker    Smokeless tobacco: Never Used   Substance Use Topics    Alcohol use: Yes     Comment: rarely    Drug use: Never       Review of Systems   Constitutional: Negative for chills and fever.   Eyes: Negative for blurred vision.    Respiratory: Negative for cough and shortness of breath.    Cardiovascular: Positive for leg swelling. Negative for chest pain and palpitations.   Gastrointestinal: Negative for constipation and nausea.   Genitourinary: Negative for dysuria.   Musculoskeletal: Negative for myalgias.   Skin: Negative for rash.   Neurological: Negative for dizziness and headaches.        Outpatient Encounter Medications as of 12/11/2020   Medication Sig Note Dispense Refill    diphenhydrAMINE (BENADRYL) 25 mg capsule Take 25 mg by mouth daily as needed.       esomeprazole (NEXIUM) 20 MG capsule Take 1 capsule (20 mg total) by mouth before breakfast.  30 capsule 3    fluticasone propionate (FLONASE) 50 mcg/actuation nasal spray        ibuprofen (ADVIL,MOTRIN) 600 MG tablet Take 1 tablet (600 mg total) by mouth every 6 (six) hours as needed for Pain.  30 tablet 1    methylPREDNISolone (MEDROL DOSEPACK) 4 mg tablet Use as directed  1 Package 0    multivitamin (ONE DAILY MULTIVITAMIN) per tablet Take 1 tablet by mouth once daily.       ondansetron (ZOFRAN-ODT) 8 MG TbDL Take 1 tablet (8 mg total) by mouth every 6 (six) hours as needed. 7/23/2020: Has not started med 15 tablet 0    oxyCODONE (ROXICODONE) 5 MG immediate release tablet Take 1 tablet (5 mg total) by mouth every 4 (four) hours as needed for Pain. 7/23/2020: Has not started med 10 tablet 0     No facility-administered encounter medications on file as of 12/11/2020.        Review of patient's allergies indicates:  No Known Allergies    Physical Exam       ]    Physical Exam  Vitals signs and nursing note reviewed.   Constitutional:       Appearance: Normal appearance. He is well-developed.   HENT:      Head: Normocephalic and atraumatic.      Right Ear: External ear normal.      Left Ear: External ear normal.   Eyes:      Conjunctiva/sclera: Conjunctivae normal.   Neck:      Musculoskeletal: Normal range of motion and neck supple.      Thyroid: No thyromegaly.       Vascular: No JVD.      Trachea: No tracheal deviation.   Cardiovascular:      Rate and Rhythm: Normal rate and regular rhythm.      Heart sounds: Normal heart sounds.   Pulmonary:      Effort: Pulmonary effort is normal.      Breath sounds: Normal breath sounds.   Abdominal:      Palpations: Abdomen is soft.   Musculoskeletal: Normal range of motion.         General: No tenderness.   Lymphadenopathy:      Cervical: No cervical adenopathy.   Skin:     General: Skin is warm and dry.      Findings: No erythema.      Comments: No swelling noted to bilateral hands or ankles.  Slight indention from socks noted.   Neurological:      Mental Status: He is alert and oriented to person, place, and time.   Psychiatric:         Behavior: Behavior normal.         Thought Content: Thought content normal.         Judgment: Judgment normal.          Laboratory:  CBC:  No results for input(s): WBC, RBC, HGB, HCT, PLT, MCV, MCH, MCHC in the last 2160 hours.  CMP:  No results for input(s): GLU, CALCIUM, ALBUMIN, PROT, NA, K, CO2, CL, BUN, ALKPHOS, ALT, AST, BILITOT in the last 2160 hours.    Invalid input(s): CREATININ  URINALYSIS:  No results for input(s): COLORU, CLARITYU, SPECGRAV, PHUR, PROTEINUA, GLUCOSEU, BILIRUBINCON, BLOODU, WBCU, RBCU, BACTERIA, MUCUS, NITRITE, LEUKOCYTESUR, UROBILINOGEN, HYALINECASTS in the last 2160 hours.   LIPIDS:  No results for input(s): TSH, HDL, CHOL, TRIG, LDLCALC, CHOLHDL, NONHDLCHOL, TOTALCHOLEST in the last 2160 hours.  TSH:  No results for input(s): TSH in the last 2160 hours.  A1C:  No results for input(s): HGBA1C in the last 2160 hours.      Assessment/Plan     Cortez Napoles is a 45 y.o.male with:    No diagnosis found.     PLAN  Instructed to monitor sodium intake.  Continue to hydrated with water.  Will order labs to check kidney function, blood sugar, and electrolytes.    Valacyclovir 500 mg daily prescribed for preventative genital herpes.    -Continue current medications and maintain  follow up with specialists.  Return to clinic as needed for any concerns        OTTONIEL MaxC  [facility]

## 2020-12-14 ENCOUNTER — PATIENT MESSAGE (OUTPATIENT)
Dept: INTERNAL MEDICINE | Facility: CLINIC | Age: 45
End: 2020-12-14

## 2020-12-14 NOTE — TELEPHONE ENCOUNTER
Well stop the thermogenic and test boost and we can repeat labs for kidney filtration and liver function    Don't think the fish oil  Or the multivitamin would cause abnormal  level

## 2020-12-14 NOTE — TELEPHONE ENCOUNTER
No infection levels okay  Electrolytes okay  Filtration of kidney slightly decrease, liver enzymes slightly elevated  Is patient taking any type of workout supplements?  This could be the cause of slight elevations

## 2021-04-05 ENCOUNTER — TELEPHONE (OUTPATIENT)
Dept: INTERNAL MEDICINE | Facility: CLINIC | Age: 46
End: 2021-04-05

## 2021-04-05 ENCOUNTER — CLINICAL SUPPORT (OUTPATIENT)
Dept: URGENT CARE | Facility: CLINIC | Age: 46
End: 2021-04-05
Payer: COMMERCIAL

## 2021-04-05 DIAGNOSIS — Z11.59 SCREENING FOR VIRAL DISEASE: Primary | ICD-10-CM

## 2021-04-05 DIAGNOSIS — Z20.822 ENCOUNTER FOR LABORATORY TESTING FOR COVID-19 VIRUS: ICD-10-CM

## 2021-04-05 PROCEDURE — 99211 PR OFFICE/OUTPT VISIT, EST, LEVL I: ICD-10-PCS | Mod: S$GLB,,, | Performed by: FAMILY MEDICINE

## 2021-04-05 PROCEDURE — 99211 OFF/OP EST MAY X REQ PHY/QHP: CPT | Mod: S$GLB,,, | Performed by: FAMILY MEDICINE

## 2021-04-05 PROCEDURE — U0003 INFECTIOUS AGENT DETECTION BY NUCLEIC ACID (DNA OR RNA); SEVERE ACUTE RESPIRATORY SYNDROME CORONAVIRUS 2 (SARS-COV-2) (CORONAVIRUS DISEASE [COVID-19]), AMPLIFIED PROBE TECHNIQUE, MAKING USE OF HIGH THROUGHPUT TECHNOLOGIES AS DESCRIBED BY CMS-2020-01-R: HCPCS | Performed by: FAMILY MEDICINE

## 2021-04-05 PROCEDURE — U0005 INFEC AGEN DETEC AMPLI PROBE: HCPCS | Performed by: FAMILY MEDICINE

## 2021-04-07 LAB — SARS-COV-2 RNA RESP QL NAA+PROBE: NOT DETECTED

## 2021-05-10 ENCOUNTER — PATIENT MESSAGE (OUTPATIENT)
Dept: RESEARCH | Facility: HOSPITAL | Age: 46
End: 2021-05-10

## 2021-06-24 ENCOUNTER — OFFICE VISIT (OUTPATIENT)
Dept: URGENT CARE | Facility: CLINIC | Age: 46
End: 2021-06-24
Payer: OTHER MISCELLANEOUS

## 2021-06-24 ENCOUNTER — HOSPITAL ENCOUNTER (EMERGENCY)
Facility: OTHER | Age: 46
Discharge: HOME | End: 2021-06-24
Attending: EMERGENCY MEDICINE
Payer: OTHER MISCELLANEOUS

## 2021-06-24 VITALS
RESPIRATION RATE: 18 BRPM | OXYGEN SATURATION: 97 % | SYSTOLIC BLOOD PRESSURE: 124 MMHG | WEIGHT: 250 LBS | HEART RATE: 53 BPM | DIASTOLIC BLOOD PRESSURE: 72 MMHG | HEIGHT: 73 IN | TEMPERATURE: 98 F | BODY MASS INDEX: 33.13 KG/M2

## 2021-06-24 VITALS
SYSTOLIC BLOOD PRESSURE: 153 MMHG | WEIGHT: 265 LBS | TEMPERATURE: 99 F | BODY MASS INDEX: 35.12 KG/M2 | OXYGEN SATURATION: 97 % | RESPIRATION RATE: 18 BRPM | DIASTOLIC BLOOD PRESSURE: 86 MMHG | HEIGHT: 73 IN | HEART RATE: 69 BPM

## 2021-06-24 DIAGNOSIS — M79.605 LEFT LEG PAIN: Primary | ICD-10-CM

## 2021-06-24 DIAGNOSIS — M79.605 LEFT LEG PAIN: ICD-10-CM

## 2021-06-24 DIAGNOSIS — S80.12XA CONTUSION OF LEFT LOWER EXTREMITY, INITIAL ENCOUNTER: ICD-10-CM

## 2021-06-24 DIAGNOSIS — G44.309 POST-TRAUMATIC HEADACHE, NOT INTRACTABLE, UNSPECIFIED CHRONICITY PATTERN: ICD-10-CM

## 2021-06-24 DIAGNOSIS — T14.90XA TRAUMA: Primary | ICD-10-CM

## 2021-06-24 DIAGNOSIS — R40.20 LOC (LOSS OF CONSCIOUSNESS): ICD-10-CM

## 2021-06-24 DIAGNOSIS — S79.922A INJURY OF LEFT THIGH, INITIAL ENCOUNTER: ICD-10-CM

## 2021-06-24 DIAGNOSIS — R20.2 PARESTHESIAS: ICD-10-CM

## 2021-06-24 LAB
ALBUMIN SERPL BCP-MCNC: 3.8 G/DL (ref 3.5–5.2)
ALP SERPL-CCNC: 60 U/L (ref 55–135)
ALT SERPL W/O P-5'-P-CCNC: 55 U/L (ref 10–44)
ANION GAP SERPL CALC-SCNC: 8 MMOL/L (ref 8–16)
AST SERPL-CCNC: 49 U/L (ref 10–40)
BASOPHILS # BLD AUTO: 0.02 K/UL (ref 0–0.2)
BASOPHILS NFR BLD: 0.3 % (ref 0–1.9)
BILIRUB SERPL-MCNC: 0.7 MG/DL (ref 0.1–1)
BILIRUB UR QL STRIP: NEGATIVE
BUN SERPL-MCNC: 13 MG/DL (ref 6–20)
CALCIUM SERPL-MCNC: 9 MG/DL (ref 8.7–10.5)
CHLORIDE SERPL-SCNC: 106 MMOL/L (ref 95–110)
CLARITY UR: CLEAR
CO2 SERPL-SCNC: 24 MMOL/L (ref 23–29)
COLOR UR: YELLOW
CREAT SERPL-MCNC: 1.5 MG/DL (ref 0.5–1.4)
CREAT SERPL-MCNC: 1.6 MG/DL (ref 0.5–1.4)
DIFFERENTIAL METHOD: ABNORMAL
EOSINOPHIL # BLD AUTO: 0.1 K/UL (ref 0–0.5)
EOSINOPHIL NFR BLD: 0.8 % (ref 0–8)
ERYTHROCYTE [DISTWIDTH] IN BLOOD BY AUTOMATED COUNT: 14.5 % (ref 11.5–14.5)
EST. GFR  (AFRICAN AMERICAN): >60 ML/MIN/1.73 M^2
EST. GFR  (NON AFRICAN AMERICAN): 54 ML/MIN/1.73 M^2
GLUCOSE SERPL-MCNC: 88 MG/DL (ref 70–110)
GLUCOSE UR QL STRIP: NEGATIVE
HCO3 UR-SCNC: 24.9 MMOL/L (ref 24–28)
HCT VFR BLD AUTO: 39.6 % (ref 40–54)
HCT VFR BLD CALC: 26 %PCV (ref 36–54)
HGB BLD-MCNC: 13.7 G/DL (ref 14–18)
HGB BLD-MCNC: 9 G/DL
HGB UR QL STRIP: NEGATIVE
IMM GRANULOCYTES # BLD AUTO: 0.01 K/UL (ref 0–0.04)
IMM GRANULOCYTES NFR BLD AUTO: 0.1 % (ref 0–0.5)
KETONES UR QL STRIP: NEGATIVE
LEUKOCYTE ESTERASE UR QL STRIP: NEGATIVE
LYMPHOCYTES # BLD AUTO: 2.3 K/UL (ref 1–4.8)
LYMPHOCYTES NFR BLD: 30.6 % (ref 18–48)
MCH RBC QN AUTO: 31.9 PG (ref 27–31)
MCHC RBC AUTO-ENTMCNC: 34.6 G/DL (ref 32–36)
MCV RBC AUTO: 92 FL (ref 82–98)
MONOCYTES # BLD AUTO: 0.7 K/UL (ref 0.3–1)
MONOCYTES NFR BLD: 8.7 % (ref 4–15)
NEUTROPHILS # BLD AUTO: 4.5 K/UL (ref 1.8–7.7)
NEUTROPHILS NFR BLD: 59.5 % (ref 38–73)
NITRITE UR QL STRIP: NEGATIVE
NRBC BLD-RTO: 0 /100 WBC
PCO2 BLDA: 37.2 MMHG (ref 35–45)
PH SMN: 7.43 [PH] (ref 7.35–7.45)
PH UR STRIP: 6 [PH] (ref 5–8)
PLATELET # BLD AUTO: 174 K/UL (ref 150–450)
PMV BLD AUTO: 10.9 FL (ref 9.2–12.9)
PO2 BLDA: 58 MMHG (ref 40–60)
POC BE: 1 MMOL/L
POC IONIZED CALCIUM: 1.27 MMOL/L (ref 1.06–1.42)
POC PTINR: 1.4 (ref 0.9–1.2)
POC PTWBT: 16.2 SEC (ref 9.7–14.3)
POC SATURATED O2: 91 % (ref 95–100)
POC TCO2: 26 MMOL/L (ref 24–29)
POTASSIUM BLD-SCNC: 4.3 MMOL/L (ref 3.5–5.1)
POTASSIUM SERPL-SCNC: 3.9 MMOL/L (ref 3.5–5.1)
PROT SERPL-MCNC: 6.8 G/DL (ref 6–8.4)
PROT UR QL STRIP: NEGATIVE
RBC # BLD AUTO: 4.29 M/UL (ref 4.6–6.2)
SAMPLE: ABNORMAL
SITE: ABNORMAL
SODIUM BLD-SCNC: 142 MMOL/L (ref 136–145)
SODIUM SERPL-SCNC: 138 MMOL/L (ref 136–145)
SP GR UR STRIP: 1.02 (ref 1–1.03)
URN SPEC COLLECT METH UR: NORMAL
UROBILINOGEN UR STRIP-ACNC: NEGATIVE EU/DL
WBC # BLD AUTO: 7.49 K/UL (ref 3.9–12.7)

## 2021-06-24 PROCEDURE — 81003 URINALYSIS AUTO W/O SCOPE: CPT | Performed by: EMERGENCY MEDICINE

## 2021-06-24 PROCEDURE — 82565 ASSAY OF CREATININE: CPT

## 2021-06-24 PROCEDURE — 85610 PROTHROMBIN TIME: CPT

## 2021-06-24 PROCEDURE — 84295 ASSAY OF SERUM SODIUM: CPT

## 2021-06-24 PROCEDURE — 85025 COMPLETE CBC W/AUTO DIFF WBC: CPT | Performed by: EMERGENCY MEDICINE

## 2021-06-24 PROCEDURE — 29505 APPLICATION LONG LEG SPLINT: CPT

## 2021-06-24 PROCEDURE — 82330 ASSAY OF CALCIUM: CPT

## 2021-06-24 PROCEDURE — 63600175 PHARM REV CODE 636 W HCPCS: Performed by: EMERGENCY MEDICINE

## 2021-06-24 PROCEDURE — 36415 COLL VENOUS BLD VENIPUNCTURE: CPT | Performed by: EMERGENCY MEDICINE

## 2021-06-24 PROCEDURE — 99499 NO LOS: ICD-10-PCS | Mod: S$GLB,,, | Performed by: INTERNAL MEDICINE

## 2021-06-24 PROCEDURE — 84132 ASSAY OF SERUM POTASSIUM: CPT

## 2021-06-24 PROCEDURE — 96374 THER/PROPH/DIAG INJ IV PUSH: CPT

## 2021-06-24 PROCEDURE — 80053 COMPREHEN METABOLIC PANEL: CPT | Performed by: EMERGENCY MEDICINE

## 2021-06-24 PROCEDURE — 99285 EMERGENCY DEPT VISIT HI MDM: CPT | Mod: 25

## 2021-06-24 PROCEDURE — 85014 HEMATOCRIT: CPT

## 2021-06-24 PROCEDURE — 99499 UNLISTED E&M SERVICE: CPT | Mod: S$GLB,,, | Performed by: INTERNAL MEDICINE

## 2021-06-24 PROCEDURE — 96375 TX/PRO/DX INJ NEW DRUG ADDON: CPT

## 2021-06-24 PROCEDURE — 99900035 HC TECH TIME PER 15 MIN (STAT)

## 2021-06-24 RX ORDER — DICLOFENAC SODIUM 10 MG/G
2 GEL TOPICAL 3 TIMES DAILY PRN
Qty: 100 G | Refills: 0 | Status: SHIPPED | OUTPATIENT
Start: 2021-06-24 | End: 2022-11-03

## 2021-06-24 RX ORDER — NAPROXEN 500 MG/1
500 TABLET ORAL 2 TIMES DAILY PRN
Qty: 60 TABLET | Refills: 0 | Status: SHIPPED | OUTPATIENT
Start: 2021-06-24 | End: 2022-11-03

## 2021-06-24 RX ORDER — KETOROLAC TROMETHAMINE 30 MG/ML
10 INJECTION, SOLUTION INTRAMUSCULAR; INTRAVENOUS
Status: COMPLETED | OUTPATIENT
Start: 2021-06-24 | End: 2021-06-24

## 2021-06-24 RX ORDER — HYDROCODONE BITARTRATE AND ACETAMINOPHEN 5; 325 MG/1; MG/1
1 TABLET ORAL EVERY 4 HOURS PRN
Qty: 10 TABLET | Refills: 0 | Status: SHIPPED | OUTPATIENT
Start: 2021-06-24 | End: 2021-06-27

## 2021-06-24 RX ORDER — METHOCARBAMOL 750 MG/1
1500 TABLET, FILM COATED ORAL 3 TIMES DAILY PRN
Qty: 30 TABLET | Refills: 0 | Status: SHIPPED | OUTPATIENT
Start: 2021-06-24 | End: 2021-06-29

## 2021-06-24 RX ORDER — ORPHENADRINE CITRATE 30 MG/ML
60 INJECTION INTRAMUSCULAR; INTRAVENOUS
Status: COMPLETED | OUTPATIENT
Start: 2021-06-24 | End: 2021-06-24

## 2021-06-24 RX ORDER — LIDOCAINE 50 MG/G
PATCH TOPICAL
Qty: 30 PATCH | Refills: 0 | Status: SHIPPED | OUTPATIENT
Start: 2021-06-24 | End: 2022-11-03

## 2021-06-24 RX ADMIN — ORPHENADRINE CITRATE 60 MG: 60 INJECTION INTRAMUSCULAR; INTRAVENOUS at 08:06

## 2021-06-24 RX ADMIN — KETOROLAC TROMETHAMINE 10 MG: 30 INJECTION, SOLUTION INTRAMUSCULAR; INTRAVENOUS at 08:06

## 2021-06-28 ENCOUNTER — OFFICE VISIT (OUTPATIENT)
Dept: URGENT CARE | Facility: CLINIC | Age: 46
End: 2021-06-28
Payer: OTHER MISCELLANEOUS

## 2021-06-28 VITALS
HEART RATE: 58 BPM | TEMPERATURE: 98 F | WEIGHT: 250 LBS | RESPIRATION RATE: 18 BRPM | BODY MASS INDEX: 33.13 KG/M2 | HEIGHT: 73 IN | DIASTOLIC BLOOD PRESSURE: 85 MMHG | SYSTOLIC BLOOD PRESSURE: 129 MMHG | OXYGEN SATURATION: 98 %

## 2021-06-28 DIAGNOSIS — S76.212A INGUINAL STRAIN, LEFT, INITIAL ENCOUNTER: ICD-10-CM
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
## 2021-06-28 DIAGNOSIS — S70.12XA CONTUSION OF LEFT ANTERIOR THIGH, INITIAL ENCOUNTER: Primary | ICD-10-CM

## 2021-06-28 PROCEDURE — 99212 PR OFFICE/OUTPT VISIT, EST, LEVL II, 10-19 MIN: ICD-10-PCS | Mod: S$GLB,,, | Performed by: NURSE PRACTITIONER

## 2021-06-28 PROCEDURE — 99212 OFFICE O/P EST SF 10 MIN: CPT | Mod: S$GLB,,, | Performed by: NURSE PRACTITIONER

## 2021-08-09 ENCOUNTER — CLINICAL SUPPORT (OUTPATIENT)
Dept: URGENT CARE | Facility: CLINIC | Age: 46
End: 2021-08-09
Payer: COMMERCIAL

## 2021-08-09 DIAGNOSIS — Z11.52 ENCOUNTER FOR SCREENING FOR COVID-19: Primary | ICD-10-CM

## 2021-08-09 LAB
CTP QC/QA: YES
SARS-COV-2 RDRP RESP QL NAA+PROBE: NEGATIVE

## 2021-08-09 PROCEDURE — U0002 COVID-19 LAB TEST NON-CDC: HCPCS | Mod: QW,S$GLB,, | Performed by: NURSE PRACTITIONER

## 2021-08-09 PROCEDURE — U0002: ICD-10-PCS | Mod: QW,S$GLB,, | Performed by: NURSE PRACTITIONER

## 2021-09-09 ENCOUNTER — OFFICE VISIT (OUTPATIENT)
Dept: URGENT CARE | Facility: CLINIC | Age: 46
End: 2021-09-09
Payer: OTHER MISCELLANEOUS

## 2021-09-09 DIAGNOSIS — U07.1 COVID-19: Primary | ICD-10-CM

## 2021-09-09 LAB
CTP QC/QA: YES
SARS-COV-2 RDRP RESP QL NAA+PROBE: NEGATIVE

## 2021-09-09 PROCEDURE — U0002: ICD-10-PCS | Mod: QW,S$GLB,, | Performed by: NURSE PRACTITIONER

## 2021-09-09 PROCEDURE — 99211 OFF/OP EST MAY X REQ PHY/QHP: CPT | Mod: S$GLB,,, | Performed by: NURSE PRACTITIONER

## 2021-09-09 PROCEDURE — U0002 COVID-19 LAB TEST NON-CDC: HCPCS | Mod: QW,S$GLB,, | Performed by: NURSE PRACTITIONER

## 2021-09-09 PROCEDURE — 99211 PR OFFICE/OUTPT VISIT, EST, LEVL I: ICD-10-PCS | Mod: S$GLB,,, | Performed by: NURSE PRACTITIONER

## 2022-06-15 ENCOUNTER — OFFICE VISIT (OUTPATIENT)
Dept: URGENT CARE | Facility: CLINIC | Age: 47
End: 2022-06-15
Payer: COMMERCIAL

## 2022-06-15 VITALS
WEIGHT: 253 LBS | SYSTOLIC BLOOD PRESSURE: 132 MMHG | RESPIRATION RATE: 17 BRPM | OXYGEN SATURATION: 97 % | DIASTOLIC BLOOD PRESSURE: 74 MMHG | BODY MASS INDEX: 33.53 KG/M2 | HEIGHT: 73 IN | TEMPERATURE: 98 F | HEART RATE: 63 BPM

## 2022-06-15 DIAGNOSIS — J06.9 VIRAL URI WITH COUGH: Primary | ICD-10-CM

## 2022-06-15 LAB
CTP QC/QA: YES
SARS-COV-2 RDRP RESP QL NAA+PROBE: NEGATIVE

## 2022-06-15 PROCEDURE — U0002 COVID-19 LAB TEST NON-CDC: HCPCS | Mod: QW,S$GLB,, | Performed by: PHYSICIAN ASSISTANT

## 2022-06-15 PROCEDURE — U0002: ICD-10-PCS | Mod: QW,S$GLB,, | Performed by: PHYSICIAN ASSISTANT

## 2022-06-15 PROCEDURE — 3075F SYST BP GE 130 - 139MM HG: CPT | Mod: CPTII,S$GLB,, | Performed by: PHYSICIAN ASSISTANT

## 2022-06-15 PROCEDURE — 99213 OFFICE O/P EST LOW 20 MIN: CPT | Mod: S$GLB,,, | Performed by: PHYSICIAN ASSISTANT

## 2022-06-15 PROCEDURE — 1160F PR REVIEW ALL MEDS BY PRESCRIBER/CLIN PHARMACIST DOCUMENTED: ICD-10-PCS | Mod: CPTII,S$GLB,, | Performed by: PHYSICIAN ASSISTANT

## 2022-06-15 PROCEDURE — 3078F DIAST BP <80 MM HG: CPT | Mod: CPTII,S$GLB,, | Performed by: PHYSICIAN ASSISTANT

## 2022-06-15 PROCEDURE — 3008F PR BODY MASS INDEX (BMI) DOCUMENTED: ICD-10-PCS | Mod: CPTII,S$GLB,, | Performed by: PHYSICIAN ASSISTANT

## 2022-06-15 PROCEDURE — 1159F PR MEDICATION LIST DOCUMENTED IN MEDICAL RECORD: ICD-10-PCS | Mod: CPTII,S$GLB,, | Performed by: PHYSICIAN ASSISTANT

## 2022-06-15 PROCEDURE — 1160F RVW MEDS BY RX/DR IN RCRD: CPT | Mod: CPTII,S$GLB,, | Performed by: PHYSICIAN ASSISTANT

## 2022-06-15 PROCEDURE — 3075F PR MOST RECENT SYSTOLIC BLOOD PRESS GE 130-139MM HG: ICD-10-PCS | Mod: CPTII,S$GLB,, | Performed by: PHYSICIAN ASSISTANT

## 2022-06-15 PROCEDURE — 99213 PR OFFICE/OUTPT VISIT, EST, LEVL III, 20-29 MIN: ICD-10-PCS | Mod: S$GLB,,, | Performed by: PHYSICIAN ASSISTANT

## 2022-06-15 PROCEDURE — 3078F PR MOST RECENT DIASTOLIC BLOOD PRESSURE < 80 MM HG: ICD-10-PCS | Mod: CPTII,S$GLB,, | Performed by: PHYSICIAN ASSISTANT

## 2022-06-15 PROCEDURE — 1159F MED LIST DOCD IN RCRD: CPT | Mod: CPTII,S$GLB,, | Performed by: PHYSICIAN ASSISTANT

## 2022-06-15 PROCEDURE — 3008F BODY MASS INDEX DOCD: CPT | Mod: CPTII,S$GLB,, | Performed by: PHYSICIAN ASSISTANT

## 2022-06-15 NOTE — LETTER
Lyubov 15, 2022      [facility] [address] [address]  Phone: [phone]  Fax: [phone]       Patient: Cortez Napoles   YOB: 1975  Date of Visit: 06/15/2022    To Whom It May Concern:    Varinder Napoles  was at [facility] on 06/15/2022. The patient may return to work/school on 6/16/2022 with no restrictions. If you have any questions or concerns, or if I can be of further assistance, please do not hesitate to contact me.    Sincerely,    Trever Platt PA-C

## 2022-06-15 NOTE — PROGRESS NOTES
"Subjective:       Patient ID: Cortez Napoles is a 47 y.o. male.    Vitals:  height is 6' 0.99" (1.854 m) and weight is 114.8 kg (253 lb). His tympanic temperature is 97.5 °F (36.4 °C). His blood pressure is 132/74 and his pulse is 63. His respiration is 17 and oxygen saturation is 97%.     Chief Complaint: Cough     47 y.o male presents today c/o post nasal drip, cough, hoarseness that began 5 days ago.  Cough is worse at night while lying down due to post nasal drip causing productive cough. Pt states that he also began seeing blood in mucous when blowing nose. No other epistaxis or hemoptysis.  Patient denies fever, body aches, sore throat, chills, or dyspnea. Of note, patient did have nasal turbinate reduction/sinuplasty/septal deviation surgery 7/2020 for chronic nasal congestion and has not had complaint or complication since then. States that likely because of this is why he is not experiencing significant nasal congestion, though is experiencing post nasal drip.   Patient is vaccinated for Covid 19 and reports unknown exposure to Covid 19.       Sinus Problem  This is a new problem. The current episode started in the past 7 days. There has been no fever. He is experiencing no pain. Associated symptoms include congestion and coughing. Pertinent negatives include no chills, diaphoresis, ear pain, headaches, neck pain, shortness of breath, sinus pressure, sneezing or sore throat. Treatments tried: Robitussin, Dayquil and Mucinex. The treatment provided mild relief.       Constitution: Negative for chills, sweating, fatigue and fever.   HENT: Positive for congestion, nosebleeds (blood in mucous only when blowing nose) and postnasal drip. Negative for ear pain, tinnitus, sinus pressure and sore throat.    Neck: Negative for neck pain and neck stiffness.   Cardiovascular: Negative for chest pain, leg swelling, palpitations and sob on exertion.   Eyes: Negative for eye itching, eye pain, eye redness and photophobia. "   Respiratory: Positive for cough and sputum production. Negative for chest tightness, bloody sputum, shortness of breath and wheezing.    Gastrointestinal: Negative for abdominal pain, nausea, vomiting and diarrhea.   Genitourinary: Negative for dysuria, frequency, urgency, flank pain and hematuria.   Musculoskeletal: Negative for pain, joint pain and abnormal ROM of joint.   Skin: Negative for color change and rash.   Allergic/Immunologic: Negative for sneezing.   Neurological: Negative for dizziness, light-headedness, coordination disturbances, headaches, disorientation, numbness and tingling.   Psychiatric/Behavioral: Negative for disorientation.       Objective:      Physical Exam   Constitutional: He is oriented to person, place, and time. He appears well-developed. He is cooperative.  Non-toxic appearance. He does not appear ill. No distress.   HENT:   Head: Normocephalic and atraumatic.   Ears:   Right Ear: Hearing, tympanic membrane, external ear and ear canal normal.   Left Ear: Hearing, tympanic membrane, external ear and ear canal normal.   Nose: No mucosal edema, rhinorrhea, purulent discharge or nasal deformity. No epistaxis. Right sinus exhibits maxillary sinus tenderness. Right sinus exhibits no frontal sinus tenderness. Left sinus exhibits maxillary sinus tenderness. Left sinus exhibits no frontal sinus tenderness.   Mouth/Throat: Uvula is midline, oropharynx is clear and moist and mucous membranes are normal. He does not have dentures. No trismus in the jaw. Normal dentition. No uvula swelling or dental caries. No oropharyngeal exudate, posterior oropharyngeal edema, posterior oropharyngeal erythema, tonsillar abscesses or cobblestoning. Tonsils are 1+ on the right. Tonsils are 1+ on the left. No tonsillar exudate.   No active epistaxis.       Comments: No active epistaxis.   Eyes: Conjunctivae and lids are normal. No scleral icterus.   Neck: Trachea normal and phonation normal. Neck supple. No  edema present. No erythema present. No neck rigidity present.   Cardiovascular: Normal rate, regular rhythm, normal heart sounds and normal pulses.   Pulmonary/Chest: Effort normal and breath sounds normal. No accessory muscle usage or stridor. No tachypnea and no bradypnea. No respiratory distress. He has no decreased breath sounds. He has no wheezes. He has no rhonchi. He has no rales.   Abdominal: Normal appearance.   Musculoskeletal: Normal range of motion.         General: No deformity. Normal range of motion.   Lymphadenopathy:        Head (right side): No submandibular, no preauricular and no posterior auricular adenopathy present.        Head (left side): No submandibular, no preauricular and no posterior auricular adenopathy present.     He has no cervical adenopathy.   Neurological: He is alert and oriented to person, place, and time. He exhibits normal muscle tone. Coordination normal.   Skin: Skin is warm, dry, intact, not diaphoretic and not pale.   Psychiatric: His speech is normal and behavior is normal. Judgment and thought content normal.   Nursing note and vitals reviewed.      Results for orders placed or performed in visit on 06/15/22   POCT COVID-19 Rapid Screening   Result Value Ref Range    POC Rapid COVID Negative Negative     Acceptable Yes            Assessment:       1. Viral URI with cough          Plan:         Viral URI with cough  -     POCT COVID-19 Rapid Screening      Instructed nasal saline spray for bloody nasal sputum. Discussed home care, tx options,  follow up precautions. Offered rx meds and patient declined.     Patient Instructions   - Rest.    - Drink plenty of fluids.  - Viral upper respiratory infections typically run their course in 10-14 days.     - Tylenol or Ibuprofen as directed as needed for fever/pain. Avoid tylenol if you have a history of liver disease. Do not take ibuprofen if you have a history of GI bleeding, kidney disease, or if you take blood  thinners.     - You can take over-the-counter claritin, zyrtec, allegra, or xyzal as directed. These are antihistamines that can help with runny nose, nasal congestion, sneezing, and helps to dry up post-nasal drip, which usually causes sore throat and cough.   - If you do NOT have high blood pressure, you may use a decongestant form (D) of this medication (ie. Claritin- D, zyrtec-D, allegra-D) or if you do not take the D form, you can take sudafed (pseudoephedrine) over the counter, which is a decongestant. Do NOT take two decongestant (D) medications at the same time (such as mucinex-D and claritin-D or plain sudafed and claritin D)    - Use nasal saline such as Ocean Spray Nasal Saline 1-3 puffs each nostril every 2-3 hours then blow out onto tissue. This is to irrigate the nasal passage way to clear the sinus openings. Use until sinus problem resolved.    - you can take plain Mucinex (guaifenesin) 1200 mg twice a day to help loosen mucous.     -warm salt water gargles can help with sore throat    - warm tea with honey can help with cough. Honey is a natural cough suppressant.    - Dextromethorphan (DM) is a cough suppressant over the counter (ie. mucinex DM, robitussin, delsym; dayquil/nyquil has DM as well.)    - Follow up with your PCP or specialty clinic as directed in the next 1-2 weeks if not improved or as needed.  You can call [phone] to schedule an appointment with the appropriate provider.      - Go to the ER if you develop new or worsening symptoms.     - You must understand that you have received an Urgent Care treatment only and that you may be released before all of your medical problems are known or treated.   - You, the patient, will arrange for follow up care as instructed.   - If your condition worsens or fails to improve we recommend that you receive another evaluation at the ER immediately or contact your PCP to discuss your concerns or return here.

## 2022-11-03 ENCOUNTER — OFFICE VISIT (OUTPATIENT)
Dept: INTERNAL MEDICINE | Facility: CLINIC | Age: 47
End: 2022-11-03
Payer: COMMERCIAL

## 2022-11-03 ENCOUNTER — LAB VISIT (OUTPATIENT)
Dept: LAB | Facility: HOSPITAL | Age: 47
End: 2022-11-03
Payer: COMMERCIAL

## 2022-11-03 VITALS
RESPIRATION RATE: 18 BRPM | HEIGHT: 73 IN | WEIGHT: 260.13 LBS | DIASTOLIC BLOOD PRESSURE: 80 MMHG | BODY MASS INDEX: 34.47 KG/M2 | HEART RATE: 60 BPM | SYSTOLIC BLOOD PRESSURE: 134 MMHG | OXYGEN SATURATION: 99 %

## 2022-11-03 DIAGNOSIS — Z00.00 ENCOUNTER FOR HEALTH MAINTENANCE EXAMINATION: Primary | ICD-10-CM

## 2022-11-03 DIAGNOSIS — Z76.89 ENCOUNTER TO ESTABLISH CARE WITH NEW DOCTOR: ICD-10-CM

## 2022-11-03 DIAGNOSIS — Z01.89 ROUTINE LAB DRAW: ICD-10-CM

## 2022-11-03 DIAGNOSIS — Z11.4 ENCOUNTER FOR SCREENING FOR HIV: ICD-10-CM

## 2022-11-03 DIAGNOSIS — Z12.11 COLON CANCER SCREENING: ICD-10-CM

## 2022-11-03 DIAGNOSIS — E66.9 OBESITY (BMI 30-39.9): ICD-10-CM

## 2022-11-03 DIAGNOSIS — Z23 NEED FOR INFLUENZA VACCINATION: ICD-10-CM

## 2022-11-03 DIAGNOSIS — Z13.220 SCREENING CHOLESTEROL LEVEL: ICD-10-CM

## 2022-11-03 DIAGNOSIS — Z11.59 ENCOUNTER FOR HEPATITIS C SCREENING TEST FOR LOW RISK PATIENT: ICD-10-CM

## 2022-11-03 DIAGNOSIS — R53.83 OTHER FATIGUE: ICD-10-CM

## 2022-11-03 LAB
ALBUMIN SERPL BCP-MCNC: 4.2 G/DL (ref 3.5–5.2)
ALP SERPL-CCNC: 61 U/L (ref 55–135)
ALT SERPL W/O P-5'-P-CCNC: 59 U/L (ref 10–44)
ANION GAP SERPL CALC-SCNC: 13 MMOL/L (ref 8–16)
AST SERPL-CCNC: 42 U/L (ref 10–40)
BASOPHILS # BLD AUTO: 0.03 K/UL (ref 0–0.2)
BASOPHILS NFR BLD: 0.5 % (ref 0–1.9)
BILIRUB SERPL-MCNC: 0.4 MG/DL (ref 0.1–1)
BUN SERPL-MCNC: 10 MG/DL (ref 6–20)
CALCIUM SERPL-MCNC: 9.9 MG/DL (ref 8.7–10.5)
CHLORIDE SERPL-SCNC: 103 MMOL/L (ref 95–110)
CHOLEST SERPL-MCNC: 181 MG/DL (ref 120–199)
CHOLEST/HDLC SERPL: 5.2 {RATIO} (ref 2–5)
CO2 SERPL-SCNC: 24 MMOL/L (ref 23–29)
CREAT SERPL-MCNC: 1.6 MG/DL (ref 0.5–1.4)
DIFFERENTIAL METHOD: ABNORMAL
EOSINOPHIL # BLD AUTO: 0.1 K/UL (ref 0–0.5)
EOSINOPHIL NFR BLD: 1.2 % (ref 0–8)
ERYTHROCYTE [DISTWIDTH] IN BLOOD BY AUTOMATED COUNT: 13.9 % (ref 11.5–14.5)
EST. GFR  (NO RACE VARIABLE): 52.5 ML/MIN/1.73 M^2
ESTIMATED AVG GLUCOSE: 111 MG/DL (ref 68–131)
FERRITIN SERPL-MCNC: 273 NG/ML (ref 20–300)
GLUCOSE SERPL-MCNC: 74 MG/DL (ref 70–110)
HBA1C MFR BLD: 5.5 % (ref 4–5.6)
HCT VFR BLD AUTO: 47.3 % (ref 40–54)
HDLC SERPL-MCNC: 35 MG/DL (ref 40–75)
HDLC SERPL: 19.3 % (ref 20–50)
HGB BLD-MCNC: 16.1 G/DL (ref 14–18)
IMM GRANULOCYTES # BLD AUTO: 0.02 K/UL (ref 0–0.04)
IMM GRANULOCYTES NFR BLD AUTO: 0.3 % (ref 0–0.5)
IRON SERPL-MCNC: 94 UG/DL (ref 45–160)
LDLC SERPL CALC-MCNC: 119.4 MG/DL (ref 63–159)
LYMPHOCYTES # BLD AUTO: 2 K/UL (ref 1–4.8)
LYMPHOCYTES NFR BLD: 34.2 % (ref 18–48)
MCH RBC QN AUTO: 31.6 PG (ref 27–31)
MCHC RBC AUTO-ENTMCNC: 34 G/DL (ref 32–36)
MCV RBC AUTO: 93 FL (ref 82–98)
MONOCYTES # BLD AUTO: 0.5 K/UL (ref 0.3–1)
MONOCYTES NFR BLD: 8.6 % (ref 4–15)
NEUTROPHILS # BLD AUTO: 3.2 K/UL (ref 1.8–7.7)
NEUTROPHILS NFR BLD: 55.2 % (ref 38–73)
NONHDLC SERPL-MCNC: 146 MG/DL
NRBC BLD-RTO: 0 /100 WBC
PLATELET # BLD AUTO: 194 K/UL (ref 150–450)
PMV BLD AUTO: 11.3 FL (ref 9.2–12.9)
POTASSIUM SERPL-SCNC: 4.3 MMOL/L (ref 3.5–5.1)
PROT SERPL-MCNC: 7.7 G/DL (ref 6–8.4)
RBC # BLD AUTO: 5.09 M/UL (ref 4.6–6.2)
SATURATED IRON: 24 % (ref 20–50)
SODIUM SERPL-SCNC: 140 MMOL/L (ref 136–145)
TOTAL IRON BINDING CAPACITY: 389 UG/DL (ref 250–450)
TRANSFERRIN SERPL-MCNC: 263 MG/DL (ref 200–375)
TRIGL SERPL-MCNC: 133 MG/DL (ref 30–150)
TSH SERPL DL<=0.005 MIU/L-ACNC: 1.68 UIU/ML (ref 0.4–4)
WBC # BLD AUTO: 5.79 K/UL (ref 3.9–12.7)

## 2022-11-03 PROCEDURE — 3008F BODY MASS INDEX DOCD: CPT | Mod: CPTII,S$GLB,, | Performed by: NURSE PRACTITIONER

## 2022-11-03 PROCEDURE — 3075F SYST BP GE 130 - 139MM HG: CPT | Mod: CPTII,S$GLB,, | Performed by: NURSE PRACTITIONER

## 2022-11-03 PROCEDURE — 3008F PR BODY MASS INDEX (BMI) DOCUMENTED: ICD-10-PCS | Mod: CPTII,S$GLB,, | Performed by: NURSE PRACTITIONER

## 2022-11-03 PROCEDURE — 80053 COMPREHEN METABOLIC PANEL: CPT | Performed by: NURSE PRACTITIONER

## 2022-11-03 PROCEDURE — 80061 LIPID PANEL: CPT | Performed by: NURSE PRACTITIONER

## 2022-11-03 PROCEDURE — 1159F PR MEDICATION LIST DOCUMENTED IN MEDICAL RECORD: ICD-10-PCS | Mod: CPTII,S$GLB,, | Performed by: NURSE PRACTITIONER

## 2022-11-03 PROCEDURE — 3079F PR MOST RECENT DIASTOLIC BLOOD PRESSURE 80-89 MM HG: ICD-10-PCS | Mod: CPTII,S$GLB,, | Performed by: NURSE PRACTITIONER

## 2022-11-03 PROCEDURE — 99999 PR PBB SHADOW E&M-EST. PATIENT-LVL IV: CPT | Mod: PBBFAC,,, | Performed by: NURSE PRACTITIONER

## 2022-11-03 PROCEDURE — 36415 COLL VENOUS BLD VENIPUNCTURE: CPT | Performed by: NURSE PRACTITIONER

## 2022-11-03 PROCEDURE — 3075F PR MOST RECENT SYSTOLIC BLOOD PRESS GE 130-139MM HG: ICD-10-PCS | Mod: CPTII,S$GLB,, | Performed by: NURSE PRACTITIONER

## 2022-11-03 PROCEDURE — 3079F DIAST BP 80-89 MM HG: CPT | Mod: CPTII,S$GLB,, | Performed by: NURSE PRACTITIONER

## 2022-11-03 PROCEDURE — 82040 ASSAY OF SERUM ALBUMIN: CPT | Mod: 59 | Performed by: NURSE PRACTITIONER

## 2022-11-03 PROCEDURE — 1159F MED LIST DOCD IN RCRD: CPT | Mod: CPTII,S$GLB,, | Performed by: NURSE PRACTITIONER

## 2022-11-03 PROCEDURE — 99999 PR PBB SHADOW E&M-EST. PATIENT-LVL IV: ICD-10-PCS | Mod: PBBFAC,,, | Performed by: NURSE PRACTITIONER

## 2022-11-03 PROCEDURE — 85025 COMPLETE CBC W/AUTO DIFF WBC: CPT | Performed by: NURSE PRACTITIONER

## 2022-11-03 PROCEDURE — 84466 ASSAY OF TRANSFERRIN: CPT | Performed by: NURSE PRACTITIONER

## 2022-11-03 PROCEDURE — 83036 HEMOGLOBIN GLYCOSYLATED A1C: CPT | Performed by: NURSE PRACTITIONER

## 2022-11-03 PROCEDURE — 99386 PR PREVENTIVE VISIT,NEW,40-64: ICD-10-PCS | Mod: 25,S$GLB,, | Performed by: NURSE PRACTITIONER

## 2022-11-03 PROCEDURE — 82728 ASSAY OF FERRITIN: CPT | Performed by: NURSE PRACTITIONER

## 2022-11-03 PROCEDURE — 1160F RVW MEDS BY RX/DR IN RCRD: CPT | Mod: CPTII,S$GLB,, | Performed by: NURSE PRACTITIONER

## 2022-11-03 PROCEDURE — 84443 ASSAY THYROID STIM HORMONE: CPT | Performed by: NURSE PRACTITIONER

## 2022-11-03 PROCEDURE — 87389 HIV-1 AG W/HIV-1&-2 AB AG IA: CPT | Performed by: NURSE PRACTITIONER

## 2022-11-03 PROCEDURE — 86803 HEPATITIS C AB TEST: CPT | Performed by: NURSE PRACTITIONER

## 2022-11-03 PROCEDURE — 1160F PR REVIEW ALL MEDS BY PRESCRIBER/CLIN PHARMACIST DOCUMENTED: ICD-10-PCS | Mod: CPTII,S$GLB,, | Performed by: NURSE PRACTITIONER

## 2022-11-03 PROCEDURE — 99386 PREV VISIT NEW AGE 40-64: CPT | Mod: 25,S$GLB,, | Performed by: NURSE PRACTITIONER

## 2022-11-03 RX ORDER — MULTIVITAMIN
1 TABLET ORAL
COMMUNITY
End: 2022-11-03

## 2022-11-03 RX ORDER — KETOCONAZOLE 20 MG/G
CREAM TOPICAL
COMMUNITY
Start: 2022-07-26 | End: 2022-11-03

## 2022-11-03 RX ORDER — KETOCONAZOLE 20 MG/ML
SHAMPOO, SUSPENSION TOPICAL
COMMUNITY
Start: 2022-07-26 | End: 2022-11-03

## 2022-11-03 NOTE — PROGRESS NOTES
Subjective:       Patient ID: Cortez Napoles is a 47 y.o. male.    Chief Complaint: Annual Exam and Establish Care    Pt new to me, here for annual to establish care.    Previous PCP has not had one    Concerned about being tired and fatigued lately. Sleeps 7-8 hrs a night. Works a regular work schedule. Would like blood work to check for this.    Review of Systems   Constitutional:  Positive for fatigue. Negative for activity change, appetite change and unexpected weight change.   HENT:  Negative for hearing loss and voice change.    Eyes:  Negative for visual disturbance.   Respiratory:  Negative for apnea, cough, chest tightness and shortness of breath.    Cardiovascular:  Negative for chest pain, palpitations and leg swelling.   Gastrointestinal:  Negative for abdominal distention, abdominal pain, blood in stool, constipation, diarrhea, nausea and vomiting.   Endocrine: Negative for cold intolerance, heat intolerance, polydipsia, polyphagia and polyuria.   Genitourinary:  Negative for difficulty urinating, dysuria and penile pain.   Musculoskeletal:  Negative for arthralgias and myalgias.   Integumentary:  Negative for color change, pallor, rash and wound.   Allergic/Immunologic: Negative for environmental allergies and food allergies.   Neurological:  Negative for dizziness, weakness, light-headedness, numbness and headaches.   Hematological:  Negative for adenopathy. Does not bruise/bleed easily.   Psychiatric/Behavioral:  Negative for agitation, behavioral problems, sleep disturbance and suicidal ideas.      Review of patient's allergies indicates:  No Known Allergies    No current outpatient medications on file.    Patient Active Problem List   Diagnosis    Rhinitis    Refractory obstruction of nasal airway    Nasal septal deviation    Nasal valve collapse    Hypertrophy of nasal turbinates    Lesion of nasal septum     History reviewed. No pertinent past medical history.    Past Surgical History:  "  Procedure Laterality Date    ENDOSCOPIC NASAL SEPTOPLASTY N/A 07/21/2020    Procedure: SEPTOPLASTY, NOSE, ENDOSCOPIC;  Surgeon: Amanuel Banegas MD;  Location: [facility] OR;  Service: ENT;  Laterality: N/A;  NO DISC AVAILABLE  RN Pre Op 7-, ---COVID NEGATIVE  ON  7-20-20 [place]    FRACTURE SURGERY Left     ankle, with ligament repair also    KNEE ARTHROSCOPY W/ MENISCAL REPAIR      NASAL ENDOSCOPY Bilateral 07/21/2020    Procedure: ENDOSCOPY, NOSE;  Surgeon: Amanuel Banegas MD;  Location: [facility] OR;  Service: ENT;  Laterality: Bilateral;     Social History     Socioeconomic History    Marital status: Single   Tobacco Use    Smoking status: Never    Smokeless tobacco: Never   Substance and Sexual Activity    Alcohol use: Yes     Comment: rarely    Drug use: Never    Sexual activity: Yes     Partners: Female     Birth control/protection: Condom     Family History   Problem Relation Age of Onset    Cancer Maternal Grandfather         prostate cancer           Objective:      Vitals:    11/03/22 1116   BP: 134/80   Pulse: 60   Resp: 18   SpO2: 99%   Weight: 118 kg (260 lb 2.3 oz)   Height: 6' 1" (1.854 m)   PainSc: 0-No pain       Body mass index is 34.32 kg/m².    Physical Exam  Vitals and nursing note reviewed.   Constitutional:       Appearance: Normal appearance. He is well-developed. He is obese.   HENT:      Head: Normocephalic.      Right Ear: Tympanic membrane, ear canal and external ear normal. There is no impacted cerumen.      Left Ear: Tympanic membrane, ear canal and external ear normal. There is no impacted cerumen.      Nose: Nose normal.      Mouth/Throat:      Mouth: Mucous membranes are moist.      Pharynx: Oropharynx is clear.   Eyes:      General: Lids are normal. Lids are everted, no foreign bodies appreciated.      Extraocular Movements: Extraocular movements intact.      Conjunctiva/sclera: Conjunctivae normal.      Pupils: Pupils are equal, round, and reactive to light.   Neck:      Thyroid: No " thyroid mass, thyromegaly or thyroid tenderness.      Vascular: No carotid bruit or JVD.      Trachea: Trachea normal.   Cardiovascular:      Rate and Rhythm: Normal rate and regular rhythm.      Pulses: Normal pulses.      Heart sounds: Normal heart sounds.   Pulmonary:      Effort: Pulmonary effort is normal.      Breath sounds: Normal breath sounds.   Abdominal:      General: Abdomen is flat. Bowel sounds are normal.      Palpations: Abdomen is soft.   Musculoskeletal:         General: Normal range of motion.      Cervical back: Full passive range of motion without pain, normal range of motion and neck supple.   Skin:     General: Skin is warm and dry.      Capillary Refill: Capillary refill takes less than 2 seconds.   Neurological:      General: No focal deficit present.      Mental Status: He is alert and oriented to person, place, and time.   Psychiatric:         Mood and Affect: Mood normal.         Speech: Speech normal.         Behavior: Behavior normal.         Thought Content: Thought content normal.         Judgment: Judgment normal.       Assessment:       Problem List Items Addressed This Visit    None  Visit Diagnoses       Encounter for health maintenance examination    -  Primary    Routine lab draw        Relevant Orders    CBC Auto Differential    Comprehensive Metabolic Panel    Lipid Panel    TSH    HIV 1/2 Ag/Ab (4th Gen)    Hepatitis C Antibody    Iron and TIBC    Ferritin    TESTOSTERONE PANEL    Hemoglobin A1C    Encounter for screening for HIV        Relevant Orders    HIV 1/2 Ag/Ab (4th Gen)    Encounter for hepatitis C screening test for low risk patient        Relevant Orders    Hepatitis C Antibody    Colon cancer screening        Relevant Orders    Ambulatory referral/consult to Endo Procedure     Need for influenza vaccination        Encounter to establish care with new doctor        Screening cholesterol level        Relevant Orders    Lipid Panel    Other fatigue         Relevant Orders    Iron and TIBC    Ferritin    TESTOSTERONE PANEL    Hemoglobin A1C    BMI 34.0-34.9,adult        Obesity (BMI 30-39.9)                Plan:       Cortez was seen today for annual exam and establish care.    Diagnoses and all orders for this visit:    Encounter for health maintenance examination  Annual wellness exam completed.    All medications, histories, and concerns reviewed, reconciled, and addressed.    Appropriate Screenings per pt's sex and age have been reviewed and discussed with pt.    BMI reviewed.    Routine lab draw  -     CBC Auto Differential; Future  -     Comprehensive Metabolic Panel; Future  -     Lipid Panel; Future  -     TSH; Future  -     HIV 1/2 Ag/Ab (4th Gen); Future  -     Hepatitis C Antibody; Future  -     Iron and TIBC; Future  -     Ferritin; Future  -     TESTOSTERONE PANEL; Future  -     Hemoglobin A1C; Future    Encounter for screening for HIV  -     HIV 1/2 Ag/Ab (4th Gen); Future    Encounter for hepatitis C screening test for low risk patient  -     Hepatitis C Antibody; Future    Colon cancer screening  -     Ambulatory referral/consult to Endo Procedure ; Future    Endoscopy will contact you to schedule your colonoscopy    Need for influenza vaccination  Given today    Encounter to establish care with new doctor  Fasting lab orders, will call with results, if results ok, RTC in 1 yr for annual or sooner prn with one of MDs I work with who can be your new PCP: Dr. Kyle Nicole, Dr. Ramakrishna Berger, Dr. Cynthia King, Dr. Sierra Ghosh, Dr. Luiz Fink, or  Dr. Juancho Ren, or Dr. Kelly Scherer    Screening cholesterol level  -     Lipid Panel; Future    Other fatigue  -     Iron and TIBC; Future  -     Ferritin; Future  -     TESTOSTERONE PANEL; Future  -     Hemoglobin A1C; Future    BMI 34.0-34.9,adult  BMI reviewed    Obesity (BMI 30-39.9)  BMI reviewed.    Diet and exercise to lose weight.    Follow up in about 1 year (around 11/3/2023) for annual or  sooner as needed with one of MDs recommended on AVS.

## 2022-11-03 NOTE — PATIENT INSTRUCTIONS
Fasting lab orders, will call with results, if results ok, RTC in 1 yr for annual or sooner prn with one of MDs I work with who can be your new PCP: Dr. Kyle Nicole, Dr. Ramakrishna Berger, Dr. Cynthia King, Dr. Sierra Ghosh, Dr. Luiz Fink, or  Dr. Juancho Ren, or Dr. Kelly Scherer    Endoscopy will contact you to schedule your colonoscopy    Flu vaccine today

## 2022-11-04 ENCOUNTER — TELEPHONE (OUTPATIENT)
Dept: INTERNAL MEDICINE | Facility: CLINIC | Age: 47
End: 2022-11-04
Payer: COMMERCIAL

## 2022-11-04 ENCOUNTER — TELEPHONE (OUTPATIENT)
Dept: UROLOGY | Facility: CLINIC | Age: 47
End: 2022-11-04
Payer: COMMERCIAL

## 2022-11-04 DIAGNOSIS — R74.8 ELEVATED LIVER ENZYMES: Primary | ICD-10-CM

## 2022-11-04 LAB
HCV AB SERPL QL IA: NORMAL
HIV 1+2 AB+HIV1 P24 AG SERPL QL IA: NORMAL

## 2022-11-04 NOTE — TELEPHONE ENCOUNTER
I spoke with Mr Napoles and informed him that Dr Prakash is not taking new Low Testosterone patients. Patient was advised to contact his pcp for management.     ----- Message from Brittanie Chau sent at 11/4/2022  1:26 PM CDT -----  Regarding: call back  Contact: [phone]  Who Called: PT     Patient called in requesting to speak with you. Did not specify why. Please advise.

## 2022-11-04 NOTE — TELEPHONE ENCOUNTER
----- Message from Melvina Alfred DNP sent at 11/4/2022  2:34 PM CDT -----  Regarding: Repeat CMP  Please schedule pt a lab appt fasting in 1 month for repeat CMP, orders in.    Melvina Alfred DNP, FNP-C

## 2022-11-04 NOTE — TELEPHONE ENCOUNTER
Pt called in regards to getting results on labs that was drawn on yesterday. Informed pt that Melvina will send him a message once all results are back in.

## 2022-11-04 NOTE — TELEPHONE ENCOUNTER
I sent pt a message in [place] explaining the results and it appears he has read my message.    Melvina Alfred DNP, FNP-C

## 2022-11-07 ENCOUNTER — TELEPHONE (OUTPATIENT)
Dept: INTERNAL MEDICINE | Facility: CLINIC | Age: 47
End: 2022-11-07
Payer: COMMERCIAL

## 2022-11-07 DIAGNOSIS — A60.01 HERPES SIMPLEX INFECTION OF PENIS: Primary | ICD-10-CM

## 2022-11-07 RX ORDER — VALACYCLOVIR HYDROCHLORIDE 500 MG/1
500 TABLET, FILM COATED ORAL DAILY
Qty: 90 TABLET | Refills: 3 | Status: SHIPPED | OUTPATIENT
Start: 2022-11-07 | End: 2023-11-07

## 2022-11-07 NOTE — TELEPHONE ENCOUNTER
----- Message from Verónica Chong sent at 11/7/2022  9:51 AM CST -----  Contact: SPARKLE FERNANDEZ [[id number]] [phone]  GENERAL CALL BACK    Patient requests a call back - has some general questions about his ultrasound 11/17/22. Patient would like to speak with provider directly if possible. Please call [phone]

## 2022-11-07 NOTE — TELEPHONE ENCOUNTER
Pt want to know why an order for an Abdominal Ultrasound was put in when this was not discussed at appt on Thursday.    Please advise,  Thank You.

## 2022-11-07 NOTE — TELEPHONE ENCOUNTER
----- Message from Mary Beth Cash sent at 11/7/2022  9:59 AM CST -----  Contact: [phone]  Patient is returning a phone call.  Who left a message for the patient: Cynthia Cuba MA     Does patient know what this is regarding:  yes  Would you like a call back, or a response through your [facility] portal?:   call back  Comments:

## 2022-11-10 LAB
ALBUMIN SERPL-MCNC: 4.4 G/DL (ref 3.6–5.1)
SHBG SERPL-SCNC: 51 NMOL/L (ref 10–50)
TESTOST FREE SERPL-MCNC: 55.9 PG/ML (ref 46–224)
TESTOST SERPL-MCNC: 590 NG/DL (ref 250–1100)
TESTOSTERONE.FREE+WB SERPL-MCNC: 112.5 NG/DL (ref 110–575)

## 2022-11-21 ENCOUNTER — PATIENT MESSAGE (OUTPATIENT)
Dept: INTERNAL MEDICINE | Facility: CLINIC | Age: 47
End: 2022-11-21
Payer: COMMERCIAL

## 2022-11-22 ENCOUNTER — OFFICE VISIT (OUTPATIENT)
Dept: URGENT CARE | Facility: CLINIC | Age: 47
End: 2022-11-22
Payer: COMMERCIAL

## 2022-11-22 VITALS
DIASTOLIC BLOOD PRESSURE: 101 MMHG | SYSTOLIC BLOOD PRESSURE: 151 MMHG | TEMPERATURE: 98 F | RESPIRATION RATE: 20 BRPM | WEIGHT: 260.13 LBS | BODY MASS INDEX: 34.32 KG/M2 | HEART RATE: 57 BPM | OXYGEN SATURATION: 99 %

## 2022-11-22 DIAGNOSIS — R09.81 NASAL CONGESTION: ICD-10-CM

## 2022-11-22 DIAGNOSIS — J06.9 VIRAL URI: Primary | ICD-10-CM

## 2022-11-22 DIAGNOSIS — R05.9 COUGH, UNSPECIFIED TYPE: ICD-10-CM

## 2022-11-22 LAB
CTP QC/QA: YES
CTP QC/QA: YES
POC MOLECULAR INFLUENZA A AGN: NEGATIVE
POC MOLECULAR INFLUENZA B AGN: NEGATIVE
SARS-COV-2 AG RESP QL IA.RAPID: NEGATIVE

## 2022-11-22 PROCEDURE — 3077F SYST BP >= 140 MM HG: CPT | Mod: CPTII,S$GLB,,

## 2022-11-22 PROCEDURE — 87502 POCT INFLUENZA A/B MOLECULAR: ICD-10-PCS | Mod: QW,S$GLB,,

## 2022-11-22 PROCEDURE — 3044F HG A1C LEVEL LT 7.0%: CPT | Mod: CPTII,S$GLB,,

## 2022-11-22 PROCEDURE — 87502 INFLUENZA DNA AMP PROBE: CPT | Mod: QW,S$GLB,,

## 2022-11-22 PROCEDURE — 3008F BODY MASS INDEX DOCD: CPT | Mod: CPTII,S$GLB,,

## 2022-11-22 PROCEDURE — 87811 SARS-COV-2 COVID19 W/OPTIC: CPT | Mod: QW,S$GLB,,

## 2022-11-22 PROCEDURE — 1160F RVW MEDS BY RX/DR IN RCRD: CPT | Mod: CPTII,S$GLB,,

## 2022-11-22 PROCEDURE — 3008F PR BODY MASS INDEX (BMI) DOCUMENTED: ICD-10-PCS | Mod: CPTII,S$GLB,,

## 2022-11-22 PROCEDURE — 3080F PR MOST RECENT DIASTOLIC BLOOD PRESSURE >= 90 MM HG: ICD-10-PCS | Mod: CPTII,S$GLB,,

## 2022-11-22 PROCEDURE — 99213 OFFICE O/P EST LOW 20 MIN: CPT | Mod: S$GLB,,,

## 2022-11-22 PROCEDURE — 3080F DIAST BP >= 90 MM HG: CPT | Mod: CPTII,S$GLB,,

## 2022-11-22 PROCEDURE — 87811 SARS CORONAVIRUS 2 ANTIGEN POCT, MANUAL READ: ICD-10-PCS | Mod: QW,S$GLB,,

## 2022-11-22 PROCEDURE — 1159F PR MEDICATION LIST DOCUMENTED IN MEDICAL RECORD: ICD-10-PCS | Mod: CPTII,S$GLB,,

## 2022-11-22 PROCEDURE — 3077F PR MOST RECENT SYSTOLIC BLOOD PRESSURE >= 140 MM HG: ICD-10-PCS | Mod: CPTII,S$GLB,,

## 2022-11-22 PROCEDURE — 99213 PR OFFICE/OUTPT VISIT, EST, LEVL III, 20-29 MIN: ICD-10-PCS | Mod: S$GLB,,,

## 2022-11-22 PROCEDURE — 1159F MED LIST DOCD IN RCRD: CPT | Mod: CPTII,S$GLB,,

## 2022-11-22 PROCEDURE — 1160F PR REVIEW ALL MEDS BY PRESCRIBER/CLIN PHARMACIST DOCUMENTED: ICD-10-PCS | Mod: CPTII,S$GLB,,

## 2022-11-22 PROCEDURE — 3044F PR MOST RECENT HEMOGLOBIN A1C LEVEL <7.0%: ICD-10-PCS | Mod: CPTII,S$GLB,,

## 2022-11-22 NOTE — PATIENT INSTRUCTIONS
- You must understand that you have received an Urgent Care treatment only and that you may be released before all of your medical problems are known or treated.   - You, the patient, will arrange for follow up care as instructed.   - If your condition worsens or fails to improve we recommend that you receive another evaluation at the ER immediately or contact your PCP to discuss your concerns or return here.   - Follow up with your PCP or specialty clinic as directed in the next 1-2 weeks if not improved or as needed.  You can call [phone] to schedule an appointment with the appropriate provider.    If your symptoms do not improve or worsen, go to the emergency room immediately.

## 2022-11-22 NOTE — LETTER
November 22, 2022      Urgent Care [address] [address]  Phone: [phone]  Fax: [phone]       Patient: Cortez Napoles   YOB: 1975  Date of Visit: 11/22/2022    To Whom It May Concern:    Varinder Napoles  was at [facility] on 11/22/2022. The patient may return to work/school on 11/22/22 with no restrictions. If you have any questions or concerns, or if I can be of further assistance, please do not hesitate to contact me.    Sincerely,    Belem Gaming PA-C

## 2022-11-22 NOTE — PROGRESS NOTES
Subjective:       Patient ID: Cortez Napoles is a 47 y.o. male.    Vitals:  weight is 118 kg (260 lb 2.3 oz). His temperature is 97.8 °F (36.6 °C). His blood pressure is 151/101 (abnormal) and his pulse is 57 (abnormal). His respiration is 20 and oxygen saturation is 99%.     Chief Complaint: Cough    Pt presents with cough and runny nose x 6 days. Symptoms started on Thursday of last week. He is using otc cough and cold, acetaminophen, and nyquil to treats symptoms with moderate relief. Causing patient to miss work on Friday. He feels better today but is still experiencing cough and runny nose. He needs to be cleared of any communicable illness to return to work. Patient states symptoms have resolved.     Cough  This is a new problem. The current episode started in the past 7 days. The problem has been gradually improving. The problem occurs every few minutes. The cough is Non-productive. Pertinent negatives include no ear pain or eye redness.     Constitution: Negative for sweating and fatigue.   HENT:  Negative for ear pain, sinus pain and sinus pressure.    Eyes:  Negative for eye pain and eye redness.   Respiratory:  Positive for cough.    Gastrointestinal:  Negative for nausea, vomiting, constipation and diarrhea.   Skin:  Negative for hives.   Allergic/Immunologic: Negative for hives and itching.   Neurological:  Negative for altered mental status.   Psychiatric/Behavioral:  Negative for altered mental status.      Objective:      Physical Exam   Constitutional: He is oriented to person, place, and time. He appears well-developed. He is cooperative.  Non-toxic appearance. He does not appear ill. No distress.      Comments:Patient sits comfortably in exam chair. Answers questions in complete sentences. Does not show any signs of distress or discoloration.        HENT:   Head: Normocephalic and atraumatic.   Ears:   Right Ear: Hearing, tympanic membrane, external ear and ear canal normal.   Left Ear: Hearing,  tympanic membrane, external ear and ear canal normal.   Nose: Nose normal. No mucosal edema, rhinorrhea, nasal deformity or congestion. No epistaxis. Right sinus exhibits no maxillary sinus tenderness and no frontal sinus tenderness. Left sinus exhibits no maxillary sinus tenderness and no frontal sinus tenderness.   Mouth/Throat: Uvula is midline, oropharynx is clear and moist and mucous membranes are normal. No trismus in the jaw. Normal dentition. No uvula swelling. No oropharyngeal exudate, posterior oropharyngeal edema or posterior oropharyngeal erythema.   Eyes: Conjunctivae and lids are normal. No scleral icterus.   Neck: Trachea normal and phonation normal. Neck supple. No edema present. No erythema present. No neck rigidity present.   Cardiovascular: Normal rate, regular rhythm, normal heart sounds and normal pulses.   Pulmonary/Chest: Effort normal and breath sounds normal. No stridor. No respiratory distress. He has no decreased breath sounds. He has no wheezes. He has no rhonchi. He has no rales.   Abdominal: Normal appearance.   Musculoskeletal: Normal range of motion.         General: No deformity. Normal range of motion.   Neurological: He is alert and oriented to person, place, and time. He exhibits normal muscle tone. Coordination normal.   Skin: Skin is warm, dry, intact, not diaphoretic and not pale.   Psychiatric: His speech is normal and behavior is normal. Judgment and thought content normal.   Nursing note and vitals reviewed.      Results for orders placed or performed in visit on 11/22/22   POCT Influenza A/B MOLECULAR   Result Value Ref Range    POC Molecular Influenza A Ag Negative Negative, Not Reported    POC Molecular Influenza B Ag Negative Negative, Not Reported     Acceptable Yes    SARS Coronavirus 2 Antigen, POCT Manual Read   Result Value Ref Range    SARS Coronavirus 2 Antigen Negative Negative     Acceptable Yes        Assessment:       1. Viral URI     2. Cough, unspecified type    3. Nasal congestion          Plan:         Viral URI    Cough, unspecified type  -     POCT Influenza A/B MOLECULAR  -     SARS Coronavirus 2 Antigen, POCT Manual Read    Nasal congestion  -     POCT Influenza A/B MOLECULAR  -     SARS Coronavirus 2 Antigen, POCT Manual Read                 Patient Instructions   - You must understand that you have received an Urgent Care treatment only and that you may be released before all of your medical problems are known or treated.   - You, the patient, will arrange for follow up care as instructed.   - If your condition worsens or fails to improve we recommend that you receive another evaluation at the ER immediately or contact your PCP to discuss your concerns or return here.   - Follow up with your PCP or specialty clinic as directed in the next 1-2 weeks if not improved or as needed.  You can call [phone] to schedule an appointment with the appropriate provider.    If your symptoms do not improve or worsen, go to the emergency room immediately.

## 2022-12-01 ENCOUNTER — TELEPHONE (OUTPATIENT)
Dept: INTERNAL MEDICINE | Facility: CLINIC | Age: 47
End: 2022-12-01
Payer: COMMERCIAL

## 2022-12-01 NOTE — TELEPHONE ENCOUNTER
----- Message from Livia Phoenix sent at 12/1/2022 10:46 AM CST -----  Contact: [phone]  Pt returning a missed call. Please Advise

## 2022-12-01 NOTE — TELEPHONE ENCOUNTER
----- Message from Dominique Gutierrez sent at 12/1/2022  8:46 AM CST -----  Regarding: Self/  [phone]  Type: Patient Call Back    Who called:  Patient    What is the request in detail:  Patient is needing another order for his Elevated liver enzymes.  He's asking \for a call back from staff.  Thank you    Would the patient rather a call back or a response via [facility]?   Call back    Best call back number:  [phone]           Thank you

## 2022-12-05 ENCOUNTER — LAB VISIT (OUTPATIENT)
Dept: LAB | Facility: HOSPITAL | Age: 47
End: 2022-12-05
Payer: COMMERCIAL

## 2022-12-05 DIAGNOSIS — R74.8 ELEVATED LIVER ENZYMES: ICD-10-CM

## 2022-12-05 LAB
ALBUMIN SERPL BCP-MCNC: 3.9 G/DL (ref 3.5–5.2)
ALP SERPL-CCNC: 63 U/L (ref 55–135)
ALT SERPL W/O P-5'-P-CCNC: 59 U/L (ref 10–44)
ANION GAP SERPL CALC-SCNC: 11 MMOL/L (ref 8–16)
AST SERPL-CCNC: 38 U/L (ref 10–40)
BILIRUB SERPL-MCNC: 0.5 MG/DL (ref 0.1–1)
BUN SERPL-MCNC: 17 MG/DL (ref 6–20)
CALCIUM SERPL-MCNC: 9.6 MG/DL (ref 8.7–10.5)
CHLORIDE SERPL-SCNC: 105 MMOL/L (ref 95–110)
CO2 SERPL-SCNC: 25 MMOL/L (ref 23–29)
CREAT SERPL-MCNC: 1.3 MG/DL (ref 0.5–1.4)
EST. GFR  (NO RACE VARIABLE): >60 ML/MIN/1.73 M^2
GLUCOSE SERPL-MCNC: 91 MG/DL (ref 70–110)
POTASSIUM SERPL-SCNC: 4.5 MMOL/L (ref 3.5–5.1)
PROT SERPL-MCNC: 7.2 G/DL (ref 6–8.4)
SODIUM SERPL-SCNC: 141 MMOL/L (ref 136–145)

## 2022-12-05 PROCEDURE — 80053 COMPREHEN METABOLIC PANEL: CPT | Performed by: NURSE PRACTITIONER

## 2022-12-05 PROCEDURE — 36415 COLL VENOUS BLD VENIPUNCTURE: CPT | Performed by: NURSE PRACTITIONER

## 2022-12-13 ENCOUNTER — CLINICAL SUPPORT (OUTPATIENT)
Dept: ENDOSCOPY | Facility: HOSPITAL | Age: 47
End: 2022-12-13
Payer: COMMERCIAL

## 2022-12-13 ENCOUNTER — TELEPHONE (OUTPATIENT)
Dept: ENDOSCOPY | Facility: HOSPITAL | Age: 47
End: 2022-12-13
Payer: COMMERCIAL

## 2022-12-13 VITALS — WEIGHT: 260 LBS | HEIGHT: 73 IN | BODY MASS INDEX: 34.46 KG/M2

## 2022-12-13 DIAGNOSIS — Z12.11 COLON CANCER SCREENING: ICD-10-CM

## 2022-12-13 DIAGNOSIS — Z12.11 SPECIAL SCREENING FOR MALIGNANT NEOPLASMS, COLON: Primary | ICD-10-CM

## 2022-12-13 RX ORDER — POLYETHYLENE GLYCOL 3350, SODIUM SULFATE ANHYDROUS, SODIUM BICARBONATE, SODIUM CHLORIDE, POTASSIUM CHLORIDE 236; 22.74; 6.74; 5.86; 2.97 G/4L; G/4L; G/4L; G/4L; G/4L
4 POWDER, FOR SOLUTION ORAL ONCE
Qty: 4000 ML | Refills: 0 | Status: SHIPPED | OUTPATIENT
Start: 2022-12-13 | End: 2022-12-13

## 2022-12-13 NOTE — PLAN OF CARE
Endoscopy procedure scheduled on 12/15/22, prep instructions reviewed, Pt verbalized understanding.

## 2022-12-13 NOTE — TELEPHONE ENCOUNTER
Patient called to states that he has an after visit summary and he didn't have his call   Pt was informed that the registration checked him in early for his audio call   Pt states he took off today to get procedure done , pt was informed that today is just an audio call to help him setup the procedure     Pt hung up

## 2022-12-15 ENCOUNTER — HOSPITAL ENCOUNTER (OUTPATIENT)
Facility: HOSPITAL | Age: 47
Discharge: HOME | End: 2022-12-15
Attending: STUDENT IN AN ORGANIZED HEALTH CARE EDUCATION/TRAINING PROGRAM | Admitting: STUDENT IN AN ORGANIZED HEALTH CARE EDUCATION/TRAINING PROGRAM
Payer: COMMERCIAL

## 2022-12-15 ENCOUNTER — ANESTHESIA (OUTPATIENT)
Dept: ENDOSCOPY | Facility: HOSPITAL | Age: 47
End: 2022-12-15
Payer: COMMERCIAL

## 2022-12-15 ENCOUNTER — ANESTHESIA EVENT (OUTPATIENT)
Dept: ENDOSCOPY | Facility: HOSPITAL | Age: 47
End: 2022-12-15
Payer: COMMERCIAL

## 2022-12-15 VITALS
BODY MASS INDEX: 33.13 KG/M2 | RESPIRATION RATE: 16 BRPM | OXYGEN SATURATION: 94 % | TEMPERATURE: 98 F | SYSTOLIC BLOOD PRESSURE: 124 MMHG | WEIGHT: 250 LBS | HEIGHT: 73 IN | HEART RATE: 68 BPM | DIASTOLIC BLOOD PRESSURE: 85 MMHG

## 2022-12-15 DIAGNOSIS — Z12.11 ENCOUNTER FOR SCREENING COLONOSCOPY: ICD-10-CM

## 2022-12-15 DIAGNOSIS — Z12.11 SCREENING FOR MALIGNANT NEOPLASM OF COLON: Primary | ICD-10-CM

## 2022-12-15 PROCEDURE — 88305 TISSUE EXAM BY PATHOLOGIST: CPT | Mod: 26,,, | Performed by: PATHOLOGY

## 2022-12-15 PROCEDURE — E9220 PRA ENDO ANESTHESIA: ICD-10-PCS | Mod: 33,,, | Performed by: NURSE ANESTHETIST, CERTIFIED REGISTERED

## 2022-12-15 PROCEDURE — 37000008 HC ANESTHESIA 1ST 15 MINUTES: Performed by: STUDENT IN AN ORGANIZED HEALTH CARE EDUCATION/TRAINING PROGRAM

## 2022-12-15 PROCEDURE — 37000009 HC ANESTHESIA EA ADD 15 MINS: Performed by: STUDENT IN AN ORGANIZED HEALTH CARE EDUCATION/TRAINING PROGRAM

## 2022-12-15 PROCEDURE — 88305 TISSUE EXAM BY PATHOLOGIST: ICD-10-PCS | Mod: 26,,, | Performed by: PATHOLOGY

## 2022-12-15 PROCEDURE — E9220 PRA ENDO ANESTHESIA: HCPCS | Mod: 33,,, | Performed by: NURSE ANESTHETIST, CERTIFIED REGISTERED

## 2022-12-15 PROCEDURE — 25000003 PHARM REV CODE 250: Performed by: NURSE ANESTHETIST, CERTIFIED REGISTERED

## 2022-12-15 PROCEDURE — 88305 TISSUE EXAM BY PATHOLOGIST: CPT | Performed by: PATHOLOGY

## 2022-12-15 PROCEDURE — 45385 COLONOSCOPY W/LESION REMOVAL: CPT | Mod: 33,,, | Performed by: STUDENT IN AN ORGANIZED HEALTH CARE EDUCATION/TRAINING PROGRAM

## 2022-12-15 PROCEDURE — 63600175 PHARM REV CODE 636 W HCPCS: Performed by: NURSE ANESTHETIST, CERTIFIED REGISTERED

## 2022-12-15 PROCEDURE — 45385 PR COLONOSCOPY,REMV LESN,SNARE: ICD-10-PCS | Mod: 33,,, | Performed by: STUDENT IN AN ORGANIZED HEALTH CARE EDUCATION/TRAINING PROGRAM

## 2022-12-15 PROCEDURE — 45385 COLONOSCOPY W/LESION REMOVAL: CPT | Mod: PT | Performed by: STUDENT IN AN ORGANIZED HEALTH CARE EDUCATION/TRAINING PROGRAM

## 2022-12-15 PROCEDURE — 27201089 HC SNARE, DISP (ANY): Performed by: STUDENT IN AN ORGANIZED HEALTH CARE EDUCATION/TRAINING PROGRAM

## 2022-12-15 RX ORDER — PROPOFOL 10 MG/ML
VIAL (ML) INTRAVENOUS CONTINUOUS PRN
Status: DISCONTINUED | OUTPATIENT
Start: 2022-12-15 | End: 2022-12-15

## 2022-12-15 RX ORDER — LIDOCAINE HYDROCHLORIDE 20 MG/ML
INJECTION, SOLUTION EPIDURAL; INFILTRATION; INTRACAUDAL; PERINEURAL
Status: DISCONTINUED | OUTPATIENT
Start: 2022-12-15 | End: 2022-12-15

## 2022-12-15 RX ORDER — PROPOFOL 10 MG/ML
VIAL (ML) INTRAVENOUS
Status: DISCONTINUED | OUTPATIENT
Start: 2022-12-15 | End: 2022-12-15

## 2022-12-15 RX ORDER — SODIUM CHLORIDE 9 MG/ML
INJECTION, SOLUTION INTRAVENOUS CONTINUOUS
Status: DISCONTINUED | OUTPATIENT
Start: 2022-12-15 | End: 2022-12-15 | Stop reason: HOSPADM

## 2022-12-15 RX ADMIN — Medication 175 MCG/KG/MIN: at 09:12

## 2022-12-15 RX ADMIN — LIDOCAINE HYDROCHLORIDE 50 MG: 20 INJECTION, SOLUTION EPIDURAL; INFILTRATION; INTRACAUDAL; PERINEURAL at 09:12

## 2022-12-15 RX ADMIN — PROPOFOL 100 MG: 10 INJECTION, EMULSION INTRAVENOUS at 09:12

## 2022-12-15 RX ADMIN — SODIUM CHLORIDE: 0.9 INJECTION, SOLUTION INTRAVENOUS at 08:12

## 2022-12-15 NOTE — PROVATION PATIENT INSTRUCTIONS
Discharge Summary/Instructions after an Endoscopic Procedure  Patient Name: Cortez Napoles  Patient MRN: [id number]  Patient YOB: 1975  Thursday, December 15, 2022  Teodoro Marks MD  Dear patient,  As a result of recent federal legislation (The Federal Cures Act), you may   receive lab or pathology results from your procedure in your [facility]   account before your physician is able to contact you. Your physician or   their representative will relay the results to you with their   recommendations at their soonest availability.  Thank you,  RESTRICTIONS:  During your procedure today, you received medications for sedation.  These   medications may affect your judgment, balance and coordination.  Therefore,   for 24 hours, you have the following restrictions:   - DO NOT drive a car, operate machinery, make legal/financial decisions,   sign important papers or drink alcohol.    ACTIVITY:  Today: no heavy lifting, straining or running due to procedural   sedation/anesthesia.  The following day: return to full activity including work.  DIET:  Eat and drink normally unless instructed otherwise.     TREATMENT FOR COMMON SIDE EFFECTS:  - Mild abdominal pain, nausea, belching, bloating or excessive gas:  rest,   eat lightly and use a heating pad.  - Sore Throat: treat with throat lozenges and/or gargle with warm salt   water.  - Because air was used during the procedure, expelling large amounts of air   from your rectum or belching is normal.  - If a bowel prep was taken, you may not have a bowel movement for 1-3 days.    This is normal.  SYMPTOMS TO WATCH FOR AND REPORT TO YOUR PHYSICIAN:  1. Abdominal pain or bloating, other than gas cramps.  2. Chest pain.  3. Back pain.  4. Signs of infection such as: chills or fever occurring within 24 hours   after the procedure.  5. Rectal bleeding, which would show as bright red, maroon, or black stools.   (A tablespoon of blood from the rectum is not serious,  especially if   hemorrhoids are present.)  6. Vomiting.  7. Weakness or dizziness.  GO DIRECTLY TO THE NEAREST EMERGENCY ROOM IF YOU HAVE ANY OF THE FOLLOWING:      Difficulty breathing              Chills and/or fever over 101 F   Persistent vomiting and/or vomiting blood   Severe abdominal pain   Severe chest pain   Black, tarry stools   Bleeding- more than one tablespoon   Any other symptom or condition that you feel may need urgent attention  Your doctor recommends these additional instructions:  If any biopsies were taken, your doctors clinic will contact you in 1 to 2   weeks with any results.  - Discharge patient to home.   - Resume previous diet.   - Continue present medications.   - Await pathology results.   - Repeat colonoscopy in 7 years for surveillance based on pathology results.     - Return to referring physician as previously scheduled.  For questions, problems or results please call your physician - Teodoro Marks MD at Work:  [phone].  [facility] EMERGENCY ROOM PHONE NUMBER: [phone]  IF A COMPLICATION OR EMERGENCY SITUATION ARISES AND YOU ARE UNABLE TO REACH   YOUR PHYSICIAN - GO DIRECTLY TO THE EMERGENCY ROOM.  MD Teodoro Colon MD  12/15/2022 9:18:50 AM  This report has been verified and signed electronically.  Dear patient,  As a result of recent federal legislation (The Federal Cures Act), you may   receive lab or pathology results from your procedure in your [facility]   account before your physician is able to contact you. Your physician or   their representative will relay the results to you with their   recommendations at their soonest availability.  Thank you,  PROVATION

## 2022-12-15 NOTE — ANESTHESIA POSTPROCEDURE EVALUATION
Anesthesia Post Evaluation    Patient: Cortez Napoles    Procedure(s) Performed: Procedure(s) (LRB):  COLONOSCOPY (N/A)    Final Anesthesia Type: general      Patient location during evaluation: PACU  Patient participation: Yes- Able to Participate  Level of consciousness: awake and alert  Post-procedure vital signs: reviewed and stable  Pain management: adequate  Airway patency: patent  DAWNA mitigation strategies: Multimodal analgesia  PONV status at discharge: No PONV  Anesthetic complications: no      Cardiovascular status: blood pressure returned to baseline and hemodynamically stable  Respiratory status: unassisted  Hydration status: euvolemic  Follow-up not needed.          Vitals Value Taken Time   /85 12/15/22 0943   Temp 36.5 °C (97.7 °F) 12/15/22 0922   Pulse 68 12/15/22 0943   Resp 16 12/15/22 0943   SpO2 94 % 12/15/22 0943         Event Time   Out of Recovery 09:58:22         Pain/Cornel Score: Cornel Score: 10 (12/15/2022  9:33 AM)

## 2022-12-15 NOTE — TRANSFER OF CARE
"Anesthesia Transfer of Care Note    Patient: Cortez Napoles    Procedure(s) Performed: Procedure(s) (LRB):  COLONOSCOPY (N/A)    Patient location: PACU    Anesthesia Type: general    Transport from OR: Transported from OR on room air with adequate spontaneous ventilation    Post pain: adequate analgesia    Post assessment: no apparent anesthetic complications    Post vital signs: stable    Level of consciousness: awake    Nausea/Vomiting: no nausea/vomiting    Complications: none    Transfer of care protocol was followed      Last vitals:   Visit Vitals  /73 (BP Location: Left arm, Patient Position: Lying)   Pulse 76   Temp 36.5 °C (97.7 °F) (Oral)   Resp 14   Ht 6' 1" (1.854 m)   Wt 113.4 kg (250 lb)   SpO2 95%   BMI 32.98 kg/m²     "

## 2022-12-15 NOTE — H&P
[facility]  Colon and Rectal Surgery    [address]  [place], [place]  Tel: [phone]  Fax: [phone]  [email or web address]/   MD Ramakrishna Lee MD Brian Kann, MD W. Forrest Johnston, MD Matthew Giglia, MD Jennifer Paruch, MD William Kethman, MD Danielle Kay, MD     Patient name: Crotez Napoles   YOB: 1975   MRN: [id number]  Date of procedure: 12/15/2022    Procedure: Colonoscopy  Indications: Screening for colon cancer and No family history of colorectal cancer    No history of colonoscopy    The patient was informed of the availability of a certified  without charge. A certified  was not necessary for this visit.    Sedation plan: MAC  ASA: ASA 2 - Patient with mild systemic disease with no functional limitations    Review of Systems  See above    History reviewed. No pertinent past medical history.  Past Surgical History:   Procedure Laterality Date    ENDOSCOPIC NASAL SEPTOPLASTY N/A 07/21/2020    Procedure: SEPTOPLASTY, NOSE, ENDOSCOPIC;  Surgeon: Amanuel Banegas MD;  Location: [facility] OR;  Service: ENT;  Laterality: N/A;  NO DISC AVAILABLE  RN Pre Op 7-, ---COVID NEGATIVE  ON  7-20-20 CA    FRACTURE SURGERY Left     ankle, with ligament repair also    KNEE ARTHROSCOPY W/ MENISCAL REPAIR      NASAL ENDOSCOPY Bilateral 07/21/2020    Procedure: ENDOSCOPY, NOSE;  Surgeon: Amanuel Banegas MD;  Location: [facility] OR;  Service: ENT;  Laterality: Bilateral;     Family History   Problem Relation Age of Onset    Cancer Maternal Grandfather         prostate cancer     Social History     Tobacco Use    Smoking status: Never    Smokeless tobacco: Never   Substance Use Topics    Alcohol use: Yes     Comment: rarely    Drug use: Never     Review of patient's allergies indicates:  No Known Allergies    Prior to Admission medications    Medication Sig Start Date End Date Taking? Authorizing Provider   valACYclovir  "(VALTREX) 500 MG tablet Take 1 tablet (500 mg total) by mouth once daily. 11/7/22 11/7/23  Melvina Alfred, CATRINA       Physical Examination  /79 (BP Location: Left arm, Patient Position: Sitting)   Pulse 60   Temp 97.7 °F (36.5 °C) (Temporal)   Resp 16   Ht 6' 1" (1.854 m)   Wt 113.4 kg (250 lb)   SpO2 97%   BMI 32.98 kg/m²      Constitutional: well developed, no cough, no dyspnea, alert, and no acute distress    Head: Normocephalic, no lesions, without obvious abnormality  Eye: Normal external eye, conjunctiva, and lids, PERRL  Cardiovascular: regular rate and regular rhythm  Respiratory: normal air entry  Gastrointestinal: soft, non-tender, without masses or organomegaly  Neurologic: alert, oriented, normal speech, no focal findings or movement disorder noted  Psychiatric: appropriate, normal mood    Plan of Care    It was a pleasure meeting Mr. Napoles today - we will plan to perform a colonoscopy with monitored anesthesia care. The details of the procedure, the possible need for biopsy or polypectomy and the potential risks including bleeding, perforation, missed polyps were discussed in detail and they consented to undergo the procedure.      Teodoro Marks MD - Staff Surgeon  Department of Colon & Rectal Surgery  [facility]    "

## 2022-12-15 NOTE — ANESTHESIA PREPROCEDURE EVALUATION
12/15/2022  Cortez Napoles is a 47 y.o., male.    History reviewed. No pertinent past medical history.  Past Surgical History:   Procedure Laterality Date    ENDOSCOPIC NASAL SEPTOPLASTY N/A 07/21/2020    Procedure: SEPTOPLASTY, NOSE, ENDOSCOPIC;  Surgeon: Amanuel Banegas MD;  Location: [facility] OR;  Service: ENT;  Laterality: N/A;  NO DISC AVAILABLE  RN Pre Op 7-, ---COVID NEGATIVE  ON  7-20-20 CA    FRACTURE SURGERY Left     ankle, with ligament repair also    KNEE ARTHROSCOPY W/ MENISCAL REPAIR      NASAL ENDOSCOPY Bilateral 07/21/2020    Procedure: ENDOSCOPY, NOSE;  Surgeon: Amanuel Banegas MD;  Location: [facility] OR;  Service: ENT;  Laterality: Bilateral;     Patient Active Problem List   Diagnosis    Rhinitis    Refractory obstruction of nasal airway    Nasal septal deviation    Nasal valve collapse    Hypertrophy of nasal turbinates    Lesion of nasal septum       Pre-op Assessment    I have reviewed the Patient Summary Reports.     I have reviewed the Nursing Notes. I have reviewed the NPO Status.   I have reviewed the Medications.     Review of Systems  Anesthesia Hx:  No problems with previous Anesthesia    Social:  Alcohol Use, Non-Smoker        Physical Exam  General: Alert and Oriented    Airway:  Mallampati: I   Mouth Opening: Normal  TM Distance: Normal  Tongue: Normal  Neck ROM: Normal ROM    Dental:  Intact        Anesthesia Plan  Type of Anesthesia, risks & benefits discussed:    Anesthesia Type: Gen Natural Airway  Intra-op Monitoring Plan: Standard ASA Monitors  Induction:  IV  Airway Plan: Direct  Informed Consent: Informed consent signed with the Patient and all parties understand the risks and agree with anesthesia plan.  All questions answered.   ASA Score: 2    Ready For Surgery From Anesthesia Perspective.     .

## 2022-12-28 LAB
FINAL PATHOLOGIC DIAGNOSIS: NORMAL
GROSS: NORMAL
Lab: NORMAL

## 2024-05-10 ENCOUNTER — PATIENT OUTREACH (OUTPATIENT)
Dept: ADMINISTRATIVE | Facility: HOSPITAL | Age: 49
End: 2024-05-10
Payer: COMMERCIAL

## 2024-05-10 NOTE — PROGRESS NOTES
Health Maintenance Due   Topic Date Due    Shingles Vaccine (1 of 2) Never done    COVID-19 Vaccine (4 - 2023-24 season) 09/01/2023       Chart reviewed and updated.  Chantel Ni LPN   Clinical Care Coordinator  Primary Care and Wellness

## 2024-05-24 ENCOUNTER — LAB VISIT (OUTPATIENT)
Dept: LAB | Facility: HOSPITAL | Age: 49
End: 2024-05-24
Payer: COMMERCIAL

## 2024-05-24 ENCOUNTER — OFFICE VISIT (OUTPATIENT)
Dept: INTERNAL MEDICINE | Facility: CLINIC | Age: 49
End: 2024-05-24
Payer: COMMERCIAL

## 2024-05-24 VITALS
TEMPERATURE: 98 F | SYSTOLIC BLOOD PRESSURE: 122 MMHG | WEIGHT: 244.81 LBS | BODY MASS INDEX: 32.44 KG/M2 | HEART RATE: 58 BPM | RESPIRATION RATE: 21 BRPM | HEIGHT: 73 IN | OXYGEN SATURATION: 98 % | DIASTOLIC BLOOD PRESSURE: 86 MMHG

## 2024-05-24 DIAGNOSIS — Z01.89 ROUTINE LAB DRAW: ICD-10-CM

## 2024-05-24 DIAGNOSIS — M79.89 LEG SWELLING: ICD-10-CM

## 2024-05-24 DIAGNOSIS — Z13.220 SCREENING CHOLESTEROL LEVEL: ICD-10-CM

## 2024-05-24 DIAGNOSIS — K21.9 GASTROESOPHAGEAL REFLUX DISEASE WITHOUT ESOPHAGITIS: ICD-10-CM

## 2024-05-24 DIAGNOSIS — Z00.00 ENCOUNTER FOR HEALTH MAINTENANCE EXAMINATION: Primary | ICD-10-CM

## 2024-05-24 DIAGNOSIS — Z23 NEED FOR SHINGLES VACCINE: ICD-10-CM

## 2024-05-24 LAB
ALBUMIN SERPL BCP-MCNC: 3.9 G/DL (ref 3.5–5.2)
ALP SERPL-CCNC: 50 U/L (ref 55–135)
ALT SERPL W/O P-5'-P-CCNC: 50 U/L (ref 10–44)
ANION GAP SERPL CALC-SCNC: 5 MMOL/L (ref 8–16)
AST SERPL-CCNC: 35 U/L (ref 10–40)
BASOPHILS # BLD AUTO: 0.03 K/UL (ref 0–0.2)
BASOPHILS NFR BLD: 0.6 % (ref 0–1.9)
BILIRUB SERPL-MCNC: 0.6 MG/DL (ref 0.1–1)
BNP SERPL-MCNC: <10 PG/ML (ref 0–99)
BUN SERPL-MCNC: 11 MG/DL (ref 6–20)
CALCIUM SERPL-MCNC: 9.4 MG/DL (ref 8.7–10.5)
CHLORIDE SERPL-SCNC: 108 MMOL/L (ref 95–110)
CHOLEST SERPL-MCNC: 169 MG/DL (ref 120–199)
CHOLEST/HDLC SERPL: 5 {RATIO} (ref 2–5)
CO2 SERPL-SCNC: 28 MMOL/L (ref 23–29)
CREAT SERPL-MCNC: 1.7 MG/DL (ref 0.5–1.4)
DIFFERENTIAL METHOD BLD: ABNORMAL
EOSINOPHIL # BLD AUTO: 0.1 K/UL (ref 0–0.5)
EOSINOPHIL NFR BLD: 1.1 % (ref 0–8)
ERYTHROCYTE [DISTWIDTH] IN BLOOD BY AUTOMATED COUNT: 13.9 % (ref 11.5–14.5)
EST. GFR  (NO RACE VARIABLE): 48.2 ML/MIN/1.73 M^2
GLUCOSE SERPL-MCNC: 90 MG/DL (ref 70–110)
HCT VFR BLD AUTO: 48.1 % (ref 40–54)
HDLC SERPL-MCNC: 34 MG/DL (ref 40–75)
HDLC SERPL: 20.1 % (ref 20–50)
HGB BLD-MCNC: 16.2 G/DL (ref 14–18)
IMM GRANULOCYTES # BLD AUTO: 0.01 K/UL (ref 0–0.04)
IMM GRANULOCYTES NFR BLD AUTO: 0.2 % (ref 0–0.5)
LDLC SERPL CALC-MCNC: 123.4 MG/DL (ref 63–159)
LYMPHOCYTES # BLD AUTO: 2 K/UL (ref 1–4.8)
LYMPHOCYTES NFR BLD: 36.3 % (ref 18–48)
MAGNESIUM SERPL-MCNC: 1.9 MG/DL (ref 1.6–2.6)
MCH RBC QN AUTO: 31.1 PG (ref 27–31)
MCHC RBC AUTO-ENTMCNC: 33.7 G/DL (ref 32–36)
MCV RBC AUTO: 92 FL (ref 82–98)
MONOCYTES # BLD AUTO: 0.4 K/UL (ref 0.3–1)
MONOCYTES NFR BLD: 7.2 % (ref 4–15)
NEUTROPHILS # BLD AUTO: 3 K/UL (ref 1.8–7.7)
NEUTROPHILS NFR BLD: 54.6 % (ref 38–73)
NONHDLC SERPL-MCNC: 135 MG/DL
NRBC BLD-RTO: 0 /100 WBC
PLATELET # BLD AUTO: 208 K/UL (ref 150–450)
PMV BLD AUTO: 11 FL (ref 9.2–12.9)
POTASSIUM SERPL-SCNC: 4.6 MMOL/L (ref 3.5–5.1)
PROT SERPL-MCNC: 7.1 G/DL (ref 6–8.4)
RBC # BLD AUTO: 5.21 M/UL (ref 4.6–6.2)
SODIUM SERPL-SCNC: 141 MMOL/L (ref 136–145)
TRIGL SERPL-MCNC: 58 MG/DL (ref 30–150)
TSH SERPL DL<=0.005 MIU/L-ACNC: 1.61 UIU/ML (ref 0.4–4)
WBC # BLD AUTO: 5.4 K/UL (ref 3.9–12.7)

## 2024-05-24 PROCEDURE — 84443 ASSAY THYROID STIM HORMONE: CPT | Performed by: NURSE PRACTITIONER

## 2024-05-24 PROCEDURE — 80053 COMPREHEN METABOLIC PANEL: CPT | Performed by: NURSE PRACTITIONER

## 2024-05-24 PROCEDURE — 99396 PREV VISIT EST AGE 40-64: CPT | Mod: S$GLB,,, | Performed by: NURSE PRACTITIONER

## 2024-05-24 PROCEDURE — 83880 ASSAY OF NATRIURETIC PEPTIDE: CPT | Performed by: NURSE PRACTITIONER

## 2024-05-24 PROCEDURE — 36415 COLL VENOUS BLD VENIPUNCTURE: CPT | Performed by: NURSE PRACTITIONER

## 2024-05-24 PROCEDURE — 99999 PR PBB SHADOW E&M-EST. PATIENT-LVL IV: CPT | Mod: PBBFAC,,, | Performed by: NURSE PRACTITIONER

## 2024-05-24 PROCEDURE — 85025 COMPLETE CBC W/AUTO DIFF WBC: CPT | Performed by: NURSE PRACTITIONER

## 2024-05-24 PROCEDURE — 3079F DIAST BP 80-89 MM HG: CPT | Mod: CPTII,S$GLB,, | Performed by: NURSE PRACTITIONER

## 2024-05-24 PROCEDURE — 3008F BODY MASS INDEX DOCD: CPT | Mod: CPTII,S$GLB,, | Performed by: NURSE PRACTITIONER

## 2024-05-24 PROCEDURE — 83735 ASSAY OF MAGNESIUM: CPT | Performed by: NURSE PRACTITIONER

## 2024-05-24 PROCEDURE — 1159F MED LIST DOCD IN RCRD: CPT | Mod: CPTII,S$GLB,, | Performed by: NURSE PRACTITIONER

## 2024-05-24 PROCEDURE — 3074F SYST BP LT 130 MM HG: CPT | Mod: CPTII,S$GLB,, | Performed by: NURSE PRACTITIONER

## 2024-05-24 PROCEDURE — 80061 LIPID PANEL: CPT | Performed by: NURSE PRACTITIONER

## 2024-05-24 RX ORDER — PANTOPRAZOLE SODIUM 40 MG/1
40 TABLET, DELAYED RELEASE ORAL DAILY PRN
Qty: 30 TABLET | Refills: 2 | Status: SHIPPED | OUTPATIENT
Start: 2024-05-24 | End: 2025-05-24

## 2024-05-24 NOTE — PROGRESS NOTES
Subjective     Patient ID: Cortez Napoles is a 49 y.o. male.    Chief Complaint: Annual Exam    Pt known to me, here for annual    No established PCP    Last annual was with me 11-3-22    Has been having some swelling in legs and hands off and on with some numbness in hands    Went back to working after retiring, has done some traveling    Has been having some reflux, nexium otc has helped, wants a Script for it    Review of Systems   Constitutional:  Negative for activity change, appetite change and unexpected weight change.   HENT:  Negative for hearing loss and voice change.    Eyes:  Negative for visual disturbance.   Respiratory:  Negative for apnea, cough, chest tightness and shortness of breath.    Cardiovascular:  Positive for leg swelling. Negative for chest pain and palpitations.        Off and on swelling   Gastrointestinal:  Positive for reflux. Negative for abdominal distention, abdominal pain, blood in stool, constipation, diarrhea, nausea and vomiting.        As documented in HPI     Endocrine: Negative for cold intolerance, heat intolerance, polydipsia, polyphagia and polyuria.   Genitourinary:  Negative for difficulty urinating, dysuria and penile pain.   Musculoskeletal:  Negative for arthralgias and myalgias.   Integumentary:  Negative for color change, pallor, rash and wound.   Allergic/Immunologic: Negative for environmental allergies and food allergies.   Neurological:  Positive for numbness. Negative for dizziness, weakness, light-headedness and headaches.        Numbness in hands   Hematological:  Negative for adenopathy. Does not bruise/bleed easily.   Psychiatric/Behavioral:  Negative for agitation, behavioral problems, sleep disturbance and suicidal ideas.      Review of patient's allergies indicates:  No Known Allergies      Current Outpatient Medications:   None    Patient Active Problem List   Diagnosis    Rhinitis    Refractory obstruction of nasal airway    Nasal septal deviation     Nasal valve collapse    Hypertrophy of nasal turbinates    Lesion of nasal septum       No past medical history on file.    Past Surgical History:   Procedure Laterality Date    COLONOSCOPY N/A 12/15/2022    Procedure: COLONOSCOPY;  Surgeon: Teodoro Marks MD;  Location: [facility] ([address] [place] [address]);  Service: Colon and Rectal;  Laterality: N/A;  12/13 instructions to portal-st    ENDOSCOPIC NASAL SEPTOPLASTY N/A 07/21/2020    Procedure: SEPTOPLASTY, NOSE, ENDOSCOPIC;  Surgeon: Amanuel Banegas MD;  Location: [facility];  Service: ENT;  Laterality: N/A;  NO DISC AVAILABLE  RN Pre Op 7-, ---COVID NEGATIVE  ON  7-20-20 [place]    FRACTURE SURGERY Left     ankle, with ligament repair also    KNEE ARTHROSCOPY W/ MENISCAL REPAIR      NASAL ENDOSCOPY Bilateral 07/21/2020    Procedure: ENDOSCOPY, NOSE;  Surgeon: Amanuel Banegas MD;  Location: [facility];  Service: ENT;  Laterality: Bilateral;       Social History     Socioeconomic History    Marital status: Single   Tobacco Use    Smoking status: Never    Smokeless tobacco: Never   Substance and Sexual Activity    Alcohol use: Yes     Comment: rarely    Drug use: Never    Sexual activity: Yes     Partners: Female     Birth control/protection: Condom     Social Determinants of Health     Financial Resource Strain: Low Risk  (5/23/2024)    Overall Financial Resource Strain (CARDIA)     Difficulty of Paying Living Expenses: Not hard at all   Food Insecurity: No Food Insecurity (5/23/2024)    Hunger Vital Sign     Worried About Running Out of Food in the Last Year: Never true     Ran Out of Food in the Last Year: Never true   Physical Activity: Sufficiently Active (5/23/2024)    Exercise Vital Sign     Days of Exercise per Week: 5 days     Minutes of Exercise per Session: 60 min   Stress: No Stress Concern Present (5/23/2024)    [place] [employer] [employer] - Occupational Stress Questionnaire     Feeling of Stress : Not at all   Housing Stability: Unknown (5/23/2024)     "Housing Stability Vital Sign     Unable to Pay for Housing in the Last Year: No       Family History   Problem Relation Name Age of Onset    Cancer Maternal Grandfather          prostate cancer       Objective     Vitals:    05/24/24 0824   BP: 122/86   Pulse: (!) 58   Resp: (!) 21   Temp: 98.1 °F (36.7 °C)   TempSrc: Oral   SpO2: 98%   Weight: 111 kg (244 lb 13.1 oz)   Height: 6' 1" (1.854 m)   PainSc: 0-No pain     Body mass index is 32.3 kg/m².    Physical Exam  Vitals and nursing note reviewed.   Constitutional:       Appearance: He is well-developed. He is obese.   HENT:      Head: Normocephalic.      Right Ear: Tympanic membrane, ear canal and external ear normal. There is no impacted cerumen.      Left Ear: Tympanic membrane, ear canal and external ear normal. There is no impacted cerumen.      Nose: Nose normal.      Mouth/Throat:      Mouth: Mucous membranes are moist.      Pharynx: Oropharynx is clear.   Eyes:      General: Lids are normal. Lids are everted, no foreign bodies appreciated.      Extraocular Movements: Extraocular movements intact.      Conjunctiva/sclera: Conjunctivae normal.      Pupils: Pupils are equal, round, and reactive to light.   Neck:      Vascular: No carotid bruit or JVD.      Trachea: Trachea normal.   Cardiovascular:      Rate and Rhythm: Regular rhythm. Bradycardia present.      Pulses: Normal pulses.           Popliteal pulses are 2+ on the right side and 2+ on the left side.        Dorsalis pedis pulses are 2+ on the right side and 2+ on the left side.        Posterior tibial pulses are 2+ on the right side and 2+ on the left side.      Heart sounds: Normal heart sounds.      Comments: Trace edema  Pulmonary:      Effort: Pulmonary effort is normal.      Breath sounds: Normal breath sounds.   Abdominal:      General: Abdomen is flat. Bowel sounds are normal.      Palpations: Abdomen is soft.   Musculoskeletal:         General: Normal range of motion.      Cervical back: Full " passive range of motion without pain, normal range of motion and neck supple.   Skin:     General: Skin is warm and dry.      Capillary Refill: Capillary refill takes less than 2 seconds.   Neurological:      General: No focal deficit present.      Mental Status: He is alert and oriented to person, place, and time.   Psychiatric:         Mood and Affect: Mood normal.         Speech: Speech normal.         Behavior: Behavior normal.         Thought Content: Thought content normal.         Judgment: Judgment normal.            Assessment and Plan     1. Encounter for health maintenance examination  Annual wellness exam completed.    All medications, histories, and concerns reviewed, reconciled, and addressed.    Appropriate Screenings per pt's sex and age have been reviewed and discussed with pt.    BMI reviewed.    2. Routine lab draw  -     CBC Auto Differential; Future; Expected date: 05/24/2024  -     Comprehensive Metabolic Panel; Future; Expected date: 05/24/2024  -     Lipid Panel; Future; Expected date: 05/24/2024  -     TSH; Future; Expected date: 05/24/2024  -     Magnesium; Future; Expected date: 05/24/2024    3. Need for shingles vaccine  -     varicella-zoster [id number], PF, (SHINGRIX) 50 mcg/0.5 mL injection; Inject 0.5 mLs into the muscle once. for 1 dose  Dispense: 1 each; Refill: 0    4. Screening cholesterol level  -     Lipid Panel; Future; Expected date: 05/24/2024    5. BMI 32.0-32.9,adult  BMI reviewed    6. Leg swelling  -     B-TYPE NATRIURETIC PEPTIDE; Future; Expected date: 05/24/2024    7.Gastroesophageal reflux disease without esophagitis  -     pantoprazole (PROTONIX) 40 MG tablet; Take 1 tablet (40 mg total) by mouth daily as needed (reflux).          Follow up in about 1 year (around 5/24/2025) for annual or sooner as needed.

## 2024-05-24 NOTE — PATIENT INSTRUCTIONS
Check annual labs, will message with results, if stable repeat in one year with annual    ShingRx discussed, 1st dose given today, will need booster 3-6 months  later

## 2024-05-28 ENCOUNTER — PATIENT MESSAGE (OUTPATIENT)
Dept: INTERNAL MEDICINE | Facility: CLINIC | Age: 49
End: 2024-05-28
Payer: COMMERCIAL

## 2024-05-28 DIAGNOSIS — N18.30 STAGE 3 CHRONIC KIDNEY DISEASE, UNSPECIFIED WHETHER STAGE 3A OR 3B CKD: Primary | ICD-10-CM

## 2024-05-31 ENCOUNTER — OFFICE VISIT (OUTPATIENT)
Dept: NEPHROLOGY | Facility: CLINIC | Age: 49
End: 2024-05-31
Payer: COMMERCIAL

## 2024-05-31 VITALS
HEIGHT: 73 IN | SYSTOLIC BLOOD PRESSURE: 116 MMHG | BODY MASS INDEX: 32.3 KG/M2 | DIASTOLIC BLOOD PRESSURE: 78 MMHG | HEART RATE: 60 BPM

## 2024-05-31 DIAGNOSIS — N17.9 AKI (ACUTE KIDNEY INJURY): ICD-10-CM

## 2024-05-31 DIAGNOSIS — R79.89 ELEVATED SERUM CREATININE: Primary | ICD-10-CM

## 2024-05-31 PROCEDURE — 99203 OFFICE O/P NEW LOW 30 MIN: CPT | Mod: S$GLB,,, | Performed by: STUDENT IN AN ORGANIZED HEALTH CARE EDUCATION/TRAINING PROGRAM

## 2024-05-31 PROCEDURE — 3074F SYST BP LT 130 MM HG: CPT | Mod: CPTII,S$GLB,, | Performed by: STUDENT IN AN ORGANIZED HEALTH CARE EDUCATION/TRAINING PROGRAM

## 2024-05-31 PROCEDURE — 1159F MED LIST DOCD IN RCRD: CPT | Mod: CPTII,S$GLB,, | Performed by: STUDENT IN AN ORGANIZED HEALTH CARE EDUCATION/TRAINING PROGRAM

## 2024-05-31 PROCEDURE — 3066F NEPHROPATHY DOC TX: CPT | Mod: CPTII,S$GLB,, | Performed by: STUDENT IN AN ORGANIZED HEALTH CARE EDUCATION/TRAINING PROGRAM

## 2024-05-31 PROCEDURE — 3008F BODY MASS INDEX DOCD: CPT | Mod: CPTII,S$GLB,, | Performed by: STUDENT IN AN ORGANIZED HEALTH CARE EDUCATION/TRAINING PROGRAM

## 2024-05-31 PROCEDURE — 99999 PR PBB SHADOW E&M-EST. PATIENT-LVL II: CPT | Mod: PBBFAC,,, | Performed by: STUDENT IN AN ORGANIZED HEALTH CARE EDUCATION/TRAINING PROGRAM

## 2024-05-31 PROCEDURE — 3078F DIAST BP <80 MM HG: CPT | Mod: CPTII,S$GLB,, | Performed by: STUDENT IN AN ORGANIZED HEALTH CARE EDUCATION/TRAINING PROGRAM

## 2024-05-31 NOTE — PROGRESS NOTES
"Subjective:       Patient ID: Cortez Napoles is a 49 y.o.  Man who presents today for  same-day evaluation of acute versus chronic renal insufficiency.  I reviewed his lab trends at chair side.  His last chemistry panel was from 7 days ago with a serum creatinine of 1.7 mg/dL, this appears mostly consistent with his values from 2 year and 3 years ago (1.6, 1.5 respectively).  He did have a reading of 1.3 mg/dL   Also approximately 2 years ago.  There is no contemporary  urinalysis to assess for renal intrinsic disease.  No prior kidney imaging available at the time of this encounter.    In terms of his renal history, he denies hospitalizations for prior SHWETA or SHWETA requiring RRT. Denies history of nephrolithiasis or hematuria episodes. No reported history of frequent or recurrent use of NSAIDs/COXI. No pertinent rheumatologic or AI disease history. Denies any pertinent family history of known kidney disease, or family members diagnosed with ESRD requiring dialysis.  Smoking Hx: never  Other pertinent urologic history: never  Fluid: "close to one gallon of fluid" per day    He has no uremic or urinary symptoms and is in his usual state of health.    There have been no recent illnesses, hospitalizations or procedures.      During this visit, the patient and I reviewed his lab trends, discussed CKD epidemiology and risk factors, as well as general lifestyle and risk factor modifications to reduce his risk of progression to ESRD.         Review of Systems   Constitutional:  Negative for chills, diaphoresis and fever.   Respiratory:  Negative for cough and shortness of breath.    Cardiovascular:  Negative for chest pain and leg swelling.   Gastrointestinal:  Negative for abdominal pain, diarrhea, nausea and vomiting.   Genitourinary:  Negative for difficulty urinating, dysuria and hematuria.   Musculoskeletal:  Negative for myalgias.   Neurological:  Negative for headaches.   Hematological:  Does not bruise/bleed easily. "       The past medical, family and social histories were reviewed for this encounter.     No past medical history on file.  Past Surgical History:   Procedure Laterality Date    COLONOSCOPY N/A 12/15/2022    Procedure: COLONOSCOPY;  Surgeon: Teodoro Marks MD;  Location: [address]);  Service: Colon and Rectal;  Laterality: N/A;  12/13 instructions to portal-st    ENDOSCOPIC NASAL SEPTOPLASTY N/A 07/21/2020    Procedure: SEPTOPLASTY, NOSE, ENDOSCOPIC;  Surgeon: Amanuel Banegas MD;  Location: [facility];  Service: ENT;  Laterality: N/A;  NO DISC AVAILABLE  RN Pre Op 7-, ---COVID NEGATIVE  ON  7-20-20 [place]    FRACTURE SURGERY Left     ankle, with ligament repair also    KNEE ARTHROSCOPY W/ MENISCAL REPAIR      NASAL ENDOSCOPY Bilateral 07/21/2020    Procedure: ENDOSCOPY, NOSE;  Surgeon: Amanuel Banegas MD;  Location: [facility];  Service: ENT;  Laterality: Bilateral;     Social History     Socioeconomic History    Marital status: Single   Tobacco Use    Smoking status: Never    Smokeless tobacco: Never   Substance and Sexual Activity    Alcohol use: Yes     Comment: rarely    Drug use: Never    Sexual activity: Yes     Partners: Female     Birth control/protection: Condom     Social Determinants of Health     Financial Resource Strain: Low Risk  (5/23/2024)    Overall Financial Resource Strain (CARDIA)     Difficulty of Paying Living Expenses: Not hard at all   Food Insecurity: No Food Insecurity (5/23/2024)    Hunger Vital Sign     Worried About Running Out of Food in the Last Year: Never true     Ran Out of Food in the Last Year: Never true   Physical Activity: Sufficiently Active (5/23/2024)    Exercise Vital Sign     Days of Exercise per Week: 5 days     Minutes of Exercise per Session: 60 min   Stress: No Stress Concern Present (5/23/2024)    [place] [employer] - Occupational Stress Questionnaire     Feeling of Stress : Not at all   Housing Stability: Unknown (5/23/2024)     "Housing Stability Vital Sign     Unable to Pay for Housing in the Last Year: No     Current Outpatient Medications   Medication Sig    pantoprazole (PROTONIX) 40 MG tablet Take 1 tablet (40 mg total) by mouth daily as needed (reflux).     No current facility-administered medications for this visit.     /78 (BP Location: Right arm, Patient Position: Sitting, BP Method: Large (Manual))   Pulse 60   Ht 6' 1" (1.854 m)   BMI 32.30 kg/m²     Objective:      Physical Exam  Vitals reviewed.   Constitutional:       General: He is not in acute distress.     Appearance: Normal appearance.   HENT:      Head: Normocephalic and atraumatic.      Right Ear: External ear normal.      Left Ear: External ear normal.      Nose: Nose normal. No congestion.      Mouth/Throat:      Mouth: Mucous membranes are moist.      Pharynx: Oropharynx is clear. No oropharyngeal exudate or posterior oropharyngeal erythema.   Eyes:      General: No scleral icterus.     Extraocular Movements: Extraocular movements intact.   Cardiovascular:      Rate and Rhythm: Normal rate and regular rhythm.      Pulses: Normal pulses.      Heart sounds: Normal heart sounds.      No friction rub.   Pulmonary:      Effort: Pulmonary effort is normal. No respiratory distress.      Breath sounds: Normal breath sounds.   Abdominal:      General: Abdomen is flat.      Palpations: Abdomen is soft.   Musculoskeletal:         General: No swelling.      Cervical back: Normal range of motion. No tenderness.      Right lower leg: No edema.      Left lower leg: No edema.   Neurological:      General: No focal deficit present.      Mental Status: He is oriented to person, place, and time.      Motor: No weakness.   Psychiatric:         Mood and Affect: Mood normal.         Behavior: Behavior normal.         Assessment:     Lab Results   Component Value Date    CREATININE 1.7 (H) 05/24/2024    BUN 11 05/24/2024     05/24/2024    K 4.6 05/24/2024     05/24/2024 " "   CO2 28 05/24/2024     Lab Results   Component Value Date    CALCIUM 9.4 05/24/2024     Lab Results   Component Value Date    HCT 48.1 05/24/2024     No results found for: "UTPCR"    No results found for: "MICALBCREAT"        1. Elevated serum creatinine    2. SHWETA (acute kidney injury)        Plan:   Return to clinic in 4 weeks  Labs for next visit include Cbc, cmp, mg, phos, pth, ua, uacr, upcr, US renal, cystatin c value  Baseline creatinine is TBD.    Elevated serum creatinine trend  -  Probably elevated baseline serum creatinine with recent mild SHWETA given the presence of increased muscle mass.  He is also taking creatine/protein supplement mentation with pre workout mixed.  - I would like to check a cystatin C value on his next set of labs in steady state and obtain an EGFR estimate from that.    - also send for contemporary urine studies and urine proteinuria assessment to stage his CKD if present.    -we also discussed obtaining a renal ultrasound to establish baseline anatomy and assess for anatomical kidney disease.      He is in agreement with plan we will obtain the necessary evaluation test in the next 2 weeks with follow up in 4.    Andi Go MD  [facility] [place]    "

## 2024-07-02 ENCOUNTER — OFFICE VISIT (OUTPATIENT)
Dept: NEPHROLOGY | Facility: CLINIC | Age: 49
End: 2024-07-02
Payer: COMMERCIAL

## 2024-07-02 VITALS
SYSTOLIC BLOOD PRESSURE: 122 MMHG | DIASTOLIC BLOOD PRESSURE: 80 MMHG | HEIGHT: 73 IN | OXYGEN SATURATION: 98 % | HEART RATE: 64 BPM | BODY MASS INDEX: 32.3 KG/M2

## 2024-07-02 DIAGNOSIS — R79.89 ELEVATED SERUM CREATININE: Primary | ICD-10-CM

## 2024-07-02 DIAGNOSIS — N18.30 STAGE 3 CHRONIC KIDNEY DISEASE, UNSPECIFIED WHETHER STAGE 3A OR 3B CKD: ICD-10-CM

## 2024-07-02 PROCEDURE — 99999 PR PBB SHADOW E&M-EST. PATIENT-LVL III: CPT | Mod: PBBFAC,,, | Performed by: STUDENT IN AN ORGANIZED HEALTH CARE EDUCATION/TRAINING PROGRAM

## 2024-07-02 NOTE — PROGRESS NOTES
"Subjective:       Patient ID: Cortez Napoles is a 49 y.o.  who returns for ongoing evaluation of acute versus chronic renal insufficiency.     In terms of his renal history, he denies hospitalizations for prior SHWETA or SHWETA requiring RRT. Denies history of nephrolithiasis or hematuria episodes. No reported history of frequent or recurrent use of NSAIDs/COXI. No pertinent rheumatologic or AI disease history. Denies any pertinent family history of known kidney disease, or family members diagnosed with ESRD requiring dialysis.  Smoking Hx: never  Other pertinent urologic history: never  Fluid: "close to one gallon of fluid" per day    Interval history:   07/02/2024 clinic visit -  Patient returns today for follow up evaluation.  He completed a kidney panel prior to today's visit and an ultrasound.  Ultrasound reviewed with the patient at chair side, he is bilateral 10-1/2 cm kidneys with no evidence of medical renal disease, unremarkable examination.  We reviewed his kidney panel.  Serum creatinine is 1.5 mg/dL, and cystatin C is 0.89 milligrams/liter.  When factoring in his serum creatinine of 1.5, his cystatin C of 0.89, his height of 73 in, in his weight of 111 kg, his 2021 CKD-epi creatinine/cystatin C estimated GFR is calculated to be 105 mL/min        Review of Systems   Constitutional:  Negative for chills, diaphoresis and fever.   Respiratory:  Negative for cough and shortness of breath.    Cardiovascular:  Negative for chest pain and leg swelling.   Gastrointestinal:  Negative for abdominal pain, diarrhea, nausea and vomiting.   Genitourinary:  Negative for difficulty urinating, dysuria and hematuria.   Musculoskeletal:  Negative for myalgias.   Neurological:  Negative for headaches.   Hematological:  Does not bruise/bleed easily.       The past medical, family and social histories were reviewed for this encounter.     No past medical history on file.  Past Surgical History:   Procedure Laterality Date    " COLONOSCOPY N/A 12/15/2022    Procedure: COLONOSCOPY;  Surgeon: Teodoro Marks MD;  [facility]  Service: Colon and Rectal;  Laterality: N/A;  12/13 instructions to portal-st    ENDOSCOPIC NASAL SEPTOPLASTY N/A 07/21/2020    Procedure: SEPTOPLASTY, NOSE, ENDOSCOPIC;  Surgeon: Amanuel Banegas MD;  Location: [facility] OR;  Service: ENT;  Laterality: N/A;  NO DISC AVAILABLE  RN Pre Op 7-, ---COVID NEGATIVE  ON  7-20-20 [place]    FRACTURE SURGERY Left     ankle, with ligament repair also    KNEE ARTHROSCOPY W/ MENISCAL REPAIR      NASAL ENDOSCOPY Bilateral 07/21/2020    Procedure: ENDOSCOPY, NOSE;  Surgeon: Amanuel Banegas MD;  Location: [facility] OR;  Service: ENT;  Laterality: Bilateral;     Social History     Socioeconomic History    Marital status: Single   Tobacco Use    Smoking status: Never    Smokeless tobacco: Never   Substance and Sexual Activity    Alcohol use: Yes     Comment: rarely    Drug use: Never    Sexual activity: Yes     Partners: Female     Birth control/protection: Condom     Social Determinants of Health     Financial Resource Strain: Low Risk  (5/23/2024)    Overall Financial Resource Strain (CARDIA)     Difficulty of Paying Living Expenses: Not hard at all   Food Insecurity: No Food Insecurity (5/23/2024)    Hunger Vital Sign     Worried About Running Out of Food in the Last Year: Never true     Ran Out of Food in the Last Year: Never true   Physical Activity: Sufficiently Active (5/23/2024)    Exercise Vital Sign     Days of Exercise per Week: 5 days     Minutes of Exercise per Session: 60 min   Stress: No Stress Concern Present (5/23/2024)    [place] [employer] - Occupational Stress Questionnaire     Feeling of Stress : Not at all   Housing Stability: Unknown (5/23/2024)    Housing Stability Vital Sign     Unable to Pay for Housing in the Last Year: No     Current Outpatient Medications   Medication Sig    pantoprazole (PROTONIX) 40 MG tablet Take 1  "tablet (40 mg total) by mouth daily as needed (reflux).     No current facility-administered medications for this visit.     /80 (BP Location: Right arm, Patient Position: Sitting, BP Method: Large (Manual))   Pulse 64   Ht 6' 1" (1.854 m)   SpO2 98%   BMI 32.30 kg/m²     Objective:      Physical Exam  Vitals reviewed.   Constitutional:       General: He is not in acute distress.     Appearance: Normal appearance.   HENT:      Head: Normocephalic and atraumatic.      Right Ear: External ear normal.      Left Ear: External ear normal.      Nose: Nose normal. No congestion.      Mouth/Throat:      Mouth: Mucous membranes are moist.      Pharynx: Oropharynx is clear. No oropharyngeal exudate or posterior oropharyngeal erythema.   Eyes:      General: No scleral icterus.     Extraocular Movements: Extraocular movements intact.   Cardiovascular:      Rate and Rhythm: Normal rate and regular rhythm.      Pulses: Normal pulses.      Heart sounds: Normal heart sounds.      No friction rub.   Pulmonary:      Effort: Pulmonary effort is normal. No respiratory distress.      Breath sounds: Normal breath sounds.   Abdominal:      General: Abdomen is flat.      Palpations: Abdomen is soft.   Musculoskeletal:         General: No swelling.      Cervical back: Normal range of motion. No tenderness.      Right lower leg: No edema.      Left lower leg: No edema.   Neurological:      General: No focal deficit present.      Mental Status: He is oriented to person, place, and time.      Motor: No weakness.   Psychiatric:         Mood and Affect: Mood normal.         Behavior: Behavior normal.         Assessment:     Lab Results   Component Value Date    CREATININE 1.5 (H) 06/13/2024    BUN 12 06/13/2024     06/13/2024    K 4.5 06/13/2024     06/13/2024    CO2 26 06/13/2024     Lab Results   Component Value Date    PTH 59.2 06/13/2024    CALCIUM 9.6 06/13/2024    PHOS 3.4 06/13/2024     Lab Results   Component Value " Date    HCT 45.7 06/13/2024     Prot/Creat Ratio, Urine   Date Value Ref Range Status   06/13/2024 Unable to calculate 0.00 - 0.20 Final       Lab Results   Component Value Date    MICALBCREAT 5.4 06/13/2024           1. Elevated serum creatinine    2. Stage 3 chronic kidney disease, unspecified whether stage 3a or 3b CKD        Plan:   Return to clinic PRN  Labs for next visit: n/a  Baseline creatinine is 1.4-1.5mg/dL    Elevated serum creatinine trend    Patient presented today for follow up evaluation of elevated serum creatinine trend.  He is notable elevated BMI secondary to elevated muscle mass.  His imaging does not show evidence of medical renal disease.  We reviewed his serologies at chair side. When factoring in his serum creatinine of 1.5, his cystatin C of 0.89, his height of 73 in, in his weight of 111 kg, his 2021 CKD-epi creatinine/cystatin C estimated GFR is calculated to be 105 mL/min    He does not have chronic kidney disease by any traditional criteria      He can return to clinic as needed.  Returned to PCP for continued evaluation of elevated LFTs.      Andi Go MD  [facility] [place]

## 2024-07-25 ENCOUNTER — OFFICE VISIT (OUTPATIENT)
Dept: URGENT CARE | Facility: CLINIC | Age: 49
End: 2024-07-25
Payer: COMMERCIAL

## 2024-07-25 VITALS
WEIGHT: 252.63 LBS | RESPIRATION RATE: 16 BRPM | DIASTOLIC BLOOD PRESSURE: 87 MMHG | HEIGHT: 73 IN | TEMPERATURE: 98 F | OXYGEN SATURATION: 96 % | SYSTOLIC BLOOD PRESSURE: 129 MMHG | HEART RATE: 65 BPM | BODY MASS INDEX: 33.48 KG/M2

## 2024-07-25 DIAGNOSIS — J01.90 ACUTE NON-RECURRENT SINUSITIS, UNSPECIFIED LOCATION: Primary | ICD-10-CM

## 2024-07-25 PROCEDURE — 99213 OFFICE O/P EST LOW 20 MIN: CPT | Mod: S$GLB,,, | Performed by: NURSE PRACTITIONER

## 2024-07-25 NOTE — LETTER
July 25, 2024      [facility] [address] [address]  Phone: [phone]  Fax: [phone]       Patient: Cortez Napoles   YOB: 1975  Date of Visit: 07/25/2024    To Whom It May Concern:    Varinder Napoles  was at [facility] on 07/25/2024. Please excuse Cortez from 07/9/2024-07/16/2024 with no restrictions. If you have any questions or concerns, or if I can be of further assistance, please do not hesitate to contact me.    Sincerely,    Shari Calderon NP

## 2024-07-25 NOTE — PATIENT INSTRUCTIONS
- You must understand that you have received an Urgent Care treatment only and that you may be released before all of your medical problems are known or treated.   - You, the patient, will arrange for follow up care as instructed.   - If your condition worsens or fails to improve we recommend that you receive another evaluation at the ER immediately or contact your PCP to discuss your concerns.   - You can call [phone] or [phone] to help schedule an appointment with the appropriate provider.    Drink plenty of fluids   Get lots of rest  Tylenol or ibuprofen for pain/fever  Mucinex DM for cough  Saline nasal rinses to irrigate sinus cavities  Warm salt water gargles for sore throat

## 2024-07-25 NOTE — PROGRESS NOTES
"Subjective:      Patient ID: Cortez Napoles is a 49 y.o. male.    Vitals:  height is 6' 1" (1.854 m) and weight is 114.6 kg (252 lb 10.4 oz). His oral temperature is 98.3 °F (36.8 °C). His blood pressure is 129/87 and his pulse is 65. His respiration is 16 and oxygen saturation is 96%.     Chief Complaint: Cough    Pt is a 48 yo male who presents today w/ productive cough (green sputum) accompanied w/ nasal congestion that began about a week ago. Pt c/o hoarse voice. Symptoms are gradually getting better, pt has returned to work but was just told today he needs a doctor's note. Denies fever and emesis. Pt tried mucinex, dayquil and nyquil no relief.     Sinus Problem  This is a new problem. The current episode started in the past 7 days. The problem is unchanged. There has been no fever. His pain is at a severity of 6/10. The pain is moderate. Associated symptoms include congestion, coughing, a hoarse voice and sinus pressure (L). Pertinent negatives include no chills, diaphoresis, ear pain, headaches, neck pain, shortness of breath, sneezing, sore throat or swollen glands. Treatments tried: Mucinex, dayquil and nyquil. The treatment provided no relief.       Constitution: Negative for chills and sweating.   HENT:  Positive for congestion and sinus pressure (L). Negative for ear pain and sore throat.    Neck: Negative for neck pain.   Respiratory:  Positive for cough. Negative for shortness of breath.    Allergic/Immunologic: Negative for sneezing.   Neurological:  Negative for headaches.      Objective:     Physical Exam   Constitutional: He is oriented to person, place, and time. He appears well-developed. He is cooperative.  Non-toxic appearance. He does not appear ill. No distress.   HENT:   Head: Normocephalic and atraumatic.   Ears:   Right Ear: Hearing, tympanic membrane, external ear and ear canal normal.   Left Ear: Hearing, tympanic membrane, external ear and ear canal normal.   Nose: Mucosal edema " present. No rhinorrhea or nasal deformity. No epistaxis. Right sinus exhibits no maxillary sinus tenderness and no frontal sinus tenderness. Left sinus exhibits maxillary sinus tenderness. Left sinus exhibits no frontal sinus tenderness.   Mouth/Throat: Uvula is midline, oropharynx is clear and moist and mucous membranes are normal. No trismus in the jaw. Normal dentition. No uvula swelling. No oropharyngeal exudate, posterior oropharyngeal edema or posterior oropharyngeal erythema.   Eyes: Conjunctivae and lids are normal. No scleral icterus.   Neck: Trachea normal and phonation normal. Neck supple. No edema present. No erythema present. No neck rigidity present.   Cardiovascular: Normal rate, regular rhythm, normal heart sounds and normal pulses.   Pulmonary/Chest: Effort normal and breath sounds normal. No respiratory distress. He has no decreased breath sounds. He has no rhonchi.   Abdominal: Normal appearance.   Musculoskeletal: Normal range of motion.         General: No deformity. Normal range of motion.   Neurological: He is alert and oriented to person, place, and time. He exhibits normal muscle tone. Coordination normal.   Skin: Skin is warm, dry, intact, not diaphoretic and not pale.   Psychiatric: His speech is normal and behavior is normal. Judgment and thought content normal.   Nursing note and vitals reviewed.      Assessment:     1. Acute non-recurrent sinusitis, unspecified location        Plan:       Acute non-recurrent sinusitis, unspecified location      Patient Instructions   - You must understand that you have received an Urgent Care treatment only and that you may be released before all of your medical problems are known or treated.   - You, the patient, will arrange for follow up care as instructed.   - If your condition worsens or fails to improve we recommend that you receive another evaluation at the ER immediately or contact your PCP to discuss your concerns.   - You can call [phone] or [phone] to help schedule an appointment with the appropriate provider.    Drink plenty of fluids   Get lots of rest  Tylenol or ibuprofen for pain/fever  Mucinex DM for cough  Saline nasal rinses to irrigate sinus cavities  Warm salt water gargles for sore throat

## 2025-01-03 ENCOUNTER — TELEPHONE (OUTPATIENT)
Dept: BARIATRICS | Facility: CLINIC | Age: 50
End: 2025-01-03
Payer: COMMERCIAL

## 2025-01-03 NOTE — TELEPHONE ENCOUNTER
----- Message from Marcia sent at 1/3/2025  2:40 PM CST -----  Regarding: Consult Advisory  Contact: Coretz  CONSULT/ADVISORY    Name of Caller: Cortez     Contact Preference: [phone] (home)       Nature of Call: Pt states that he is interested in weight loss program and would like to know more information on what is required and/or needed for the process. Would like a call back

## (undated) DEVICE — SUT PLN GUT 4-0 SC-1SC-1 1

## (undated) DEVICE — SYS LABLNG CORECT MED 4 FLG

## (undated) DEVICE — SOL NS 1000CC

## (undated) DEVICE — GLOVE SURG BIOGEL LATEX SZ 7.5

## (undated) DEVICE — POSITIONER HEAD DONUT 9IN FOAM

## (undated) DEVICE — BLADE INFERIOR TURBINATE 2MM

## (undated) DEVICE — KIT SURGIFLO HEMOSTATIC MATRIX

## (undated) DEVICE — SYR 12CC CNTRL L-L NO NDL

## (undated) DEVICE — SEE MEDLINE ITEM 152622

## (undated) DEVICE — CONTAINER SPECIMEN STRL 4OZ

## (undated) DEVICE — SYR 50CC LL

## (undated) DEVICE — SEE MEDLINE ITEM 157110

## (undated) DEVICE — SUPPORT ULNA NERVE PROTECTOR

## (undated) DEVICE — SUCTION COAGULATOR 10FR 6IN

## (undated) DEVICE — SHEATH LENS CLN 4MM 0D A70940A

## (undated) DEVICE — TUBING XPS IRRIG TO STRAIGHTSH

## (undated) DEVICE — NDL SPINAL SPINOCAN 22GX3.5

## (undated) DEVICE — SET EXT W/2 VLVE PORTS 40

## (undated) DEVICE — NASAL AIRWAY SPLINT

## (undated) DEVICE — BLANKET LOWER BODY 55.9X40.2IN

## (undated) DEVICE — SPONGE PATTY SURGICAL .5X3IN

## (undated) DEVICE — NDL 27G X 1 1/4

## (undated) DEVICE — SUT PROLENE 3-0 36 V-7V-7

## (undated) DEVICE — SOL 9P NACL IRR PIC IL

## (undated) DEVICE — SUT 4-0 CHROMIC GUT / P-3

## (undated) DEVICE — HEMOSTAT SURGICEL FIBRLR 1X2IN

## (undated) DEVICE — DRAPE STERI INSTRUMENT 1018

## (undated) DEVICE — ELECTRODE REM PLYHSV RETURN 9

## (undated) DEVICE — SEE MEDLINE ITEM 146313

## (undated) DEVICE — SPLINT INTRANASAL POSISEP .6X2